# Patient Record
Sex: FEMALE | Race: WHITE | NOT HISPANIC OR LATINO | Employment: OTHER | URBAN - METROPOLITAN AREA
[De-identification: names, ages, dates, MRNs, and addresses within clinical notes are randomized per-mention and may not be internally consistent; named-entity substitution may affect disease eponyms.]

---

## 2020-11-16 ENCOUNTER — CONVERSION ENCOUNTER (OUTPATIENT)
Dept: FAMILY MEDICINE CLINIC | Facility: CLINIC | Age: 65
End: 2020-11-16

## 2020-11-16 ENCOUNTER — OFFICE VISIT CONVERTED (OUTPATIENT)
Dept: FAMILY MEDICINE CLINIC | Facility: CLINIC | Age: 65
End: 2020-11-16
Attending: NURSE PRACTITIONER

## 2020-11-20 ENCOUNTER — HOSPITAL ENCOUNTER (OUTPATIENT)
Dept: LAB | Facility: HOSPITAL | Age: 65
Discharge: HOME | End: 2020-11-20
Attending: NURSE PRACTITIONER

## 2020-11-20 LAB
25(OH)D3 SERPL-MCNC: 24 NG/ML (ref 30–100)
ALBUMIN SERPL-MCNC: 3.8 G/DL (ref 3.5–5)
ALBUMIN/GLOB SERPL: 1 {RATIO} (ref 1.4–2.6)
ALP SERPL-CCNC: 113 U/L (ref 43–160)
ALT SERPL-CCNC: 10 U/L (ref 10–40)
ANION GAP SERPL CALC-SCNC: 16 MMOL/L (ref 8–19)
AST SERPL-CCNC: 15 U/L (ref 15–50)
BASOPHILS # BLD AUTO: 0.04 10*3/UL (ref 0–0.2)
BASOPHILS NFR BLD AUTO: 0.5 % (ref 0–3)
BILIRUB SERPL-MCNC: 0.62 MG/DL (ref 0.2–1.3)
BUN SERPL-MCNC: 16 MG/DL (ref 5–25)
BUN/CREAT SERPL: 15 {RATIO} (ref 6–20)
CALCIUM SERPL-MCNC: 9.8 MG/DL (ref 8.7–10.4)
CHLORIDE SERPL-SCNC: 99 MMOL/L (ref 99–111)
CHOLEST SERPL-MCNC: 170 MG/DL (ref 107–200)
CHOLEST/HDLC SERPL: 3.9 {RATIO} (ref 3–6)
CONV ABS IMM GRAN: 0.04 10*3/UL (ref 0–0.2)
CONV CO2: 27 MMOL/L (ref 22–32)
CONV CREATININE URINE, RANDOM: 120 MG/DL (ref 10–300)
CONV IMMATURE GRAN: 0.5 % (ref 0–1.8)
CONV MICROALBUM.,U,RANDOM: 25.6 MG/L (ref 0–20)
CONV TOTAL PROTEIN: 7.5 G/DL (ref 6.3–8.2)
CREAT UR-MCNC: 1.07 MG/DL (ref 0.5–0.9)
DEPRECATED RDW RBC AUTO: 50.1 FL (ref 36.4–46.3)
EOSINOPHIL # BLD AUTO: 0.34 10*3/UL (ref 0–0.7)
EOSINOPHIL # BLD AUTO: 3.9 % (ref 0–7)
ERYTHROCYTE [DISTWIDTH] IN BLOOD BY AUTOMATED COUNT: 16 % (ref 11.7–14.4)
EST. AVERAGE GLUCOSE BLD GHB EST-MCNC: 197 MG/DL
FERRITIN SERPL-MCNC: 127 NG/ML (ref 10–200)
FOLATE SERPL-MCNC: 4.9 NG/ML (ref 4.8–20)
GFR SERPLBLD BASED ON 1.73 SQ M-ARVRAT: 54 ML/MIN/{1.73_M2}
GLOBULIN UR ELPH-MCNC: 3.7 G/DL (ref 2–3.5)
GLUCOSE SERPL-MCNC: 163 MG/DL (ref 65–99)
HBA1C MFR BLD: 8.5 % (ref 3.5–5.7)
HCT VFR BLD AUTO: 40.8 % (ref 37–47)
HDLC SERPL-MCNC: 44 MG/DL (ref 40–60)
HGB BLD-MCNC: 13.1 G/DL (ref 12–16)
IRON SATN MFR SERPL: 20 % (ref 20–55)
IRON SERPL-MCNC: 61 UG/DL (ref 60–170)
LDLC SERPL CALC-MCNC: 106 MG/DL (ref 70–100)
LYMPHOCYTES # BLD AUTO: 1.71 10*3/UL (ref 1–5)
LYMPHOCYTES NFR BLD AUTO: 19.5 % (ref 20–45)
MCH RBC QN AUTO: 27.7 PG (ref 27–31)
MCHC RBC AUTO-ENTMCNC: 32.1 G/DL (ref 33–37)
MCV RBC AUTO: 86.3 FL (ref 81–99)
MICROALBUMIN/CREAT UR: 21.3 MG/G{CRE} (ref 0–35)
MONOCYTES # BLD AUTO: 0.49 10*3/UL (ref 0.2–1.2)
MONOCYTES NFR BLD AUTO: 5.6 % (ref 3–10)
NEUTROPHILS # BLD AUTO: 6.13 10*3/UL (ref 2–8)
NEUTROPHILS NFR BLD AUTO: 70 % (ref 30–85)
NRBC CBCN: 0 % (ref 0–0.7)
OSMOLALITY SERPL CALC.SUM OF ELEC: 291 MOSM/KG (ref 273–304)
PLATELET # BLD AUTO: 209 10*3/UL (ref 130–400)
PMV BLD AUTO: 9.1 FL (ref 9.4–12.3)
POTASSIUM SERPL-SCNC: 4.2 MMOL/L (ref 3.5–5.3)
RBC # BLD AUTO: 4.73 10*6/UL (ref 4.2–5.4)
SODIUM SERPL-SCNC: 138 MMOL/L (ref 135–147)
TIBC SERPL-MCNC: 307 UG/DL (ref 245–450)
TRANSFERRIN SERPL-MCNC: 215 MG/DL (ref 250–380)
TRIGL SERPL-MCNC: 100 MG/DL (ref 40–150)
VIT B12 SERPL-MCNC: 349 PG/ML (ref 211–911)
VLDLC SERPL-MCNC: 20 MG/DL (ref 5–37)
WBC # BLD AUTO: 8.75 10*3/UL (ref 4.8–10.8)

## 2021-02-24 ENCOUNTER — OFFICE VISIT CONVERTED (OUTPATIENT)
Dept: FAMILY MEDICINE CLINIC | Facility: CLINIC | Age: 66
End: 2021-02-24
Attending: NURSE PRACTITIONER

## 2021-02-25 ENCOUNTER — HOSPITAL ENCOUNTER (OUTPATIENT)
Dept: OTHER | Facility: HOSPITAL | Age: 66
Discharge: HOME | End: 2021-02-25
Attending: NURSE PRACTITIONER

## 2021-02-25 LAB
25(OH)D3 SERPL-MCNC: 22.4 NG/ML (ref 30–100)
ALBUMIN SERPL-MCNC: 3.5 G/DL (ref 3.5–5)
ALBUMIN/GLOB SERPL: 1.1 {RATIO} (ref 1.4–2.6)
ALP SERPL-CCNC: 132 U/L (ref 43–160)
ALT SERPL-CCNC: 11 U/L (ref 10–40)
ANION GAP SERPL CALC-SCNC: 9 MMOL/L (ref 8–19)
AST SERPL-CCNC: 10 U/L (ref 15–50)
BASOPHILS # BLD AUTO: 0.06 10*3/UL (ref 0–0.2)
BASOPHILS NFR BLD AUTO: 0.6 % (ref 0–3)
BILIRUB SERPL-MCNC: 0.73 MG/DL (ref 0.2–1.3)
BNP SERPL-MCNC: 171 PG/ML (ref 0–900)
BUN SERPL-MCNC: 12 MG/DL (ref 5–25)
BUN/CREAT SERPL: 14 {RATIO} (ref 6–20)
CALCIUM SERPL-MCNC: 9.2 MG/DL (ref 8.7–10.4)
CHLORIDE SERPL-SCNC: 95 MMOL/L (ref 99–111)
CHOLEST SERPL-MCNC: 187 MG/DL (ref 107–200)
CHOLEST/HDLC SERPL: 4 {RATIO} (ref 3–6)
CONV ABS IMM GRAN: 0.04 10*3/UL (ref 0–0.2)
CONV CO2: 29 MMOL/L (ref 22–32)
CONV CREATININE URINE, RANDOM: 113.5 MG/DL (ref 10–300)
CONV IMMATURE GRAN: 0.4 % (ref 0–1.8)
CONV MICROALBUM.,U,RANDOM: 22.9 MG/L (ref 0–20)
CONV TOTAL PROTEIN: 6.7 G/DL (ref 6.3–8.2)
CREAT UR-MCNC: 0.87 MG/DL (ref 0.5–0.9)
DEPRECATED RDW RBC AUTO: 49.8 FL (ref 36.4–46.3)
EOSINOPHIL # BLD AUTO: 0.28 10*3/UL (ref 0–0.7)
EOSINOPHIL # BLD AUTO: 2.7 % (ref 0–7)
ERYTHROCYTE [DISTWIDTH] IN BLOOD BY AUTOMATED COUNT: 15.5 % (ref 11.7–14.4)
EST. AVERAGE GLUCOSE BLD GHB EST-MCNC: 194 MG/DL
FERRITIN SERPL-MCNC: 147 NG/ML (ref 10–200)
GFR SERPLBLD BASED ON 1.73 SQ M-ARVRAT: >60 ML/MIN/{1.73_M2}
GLOBULIN UR ELPH-MCNC: 3.2 G/DL (ref 2–3.5)
GLUCOSE SERPL-MCNC: 335 MG/DL (ref 65–99)
HBA1C MFR BLD: 8.4 % (ref 3.5–5.7)
HCT VFR BLD AUTO: 40 % (ref 37–47)
HDLC SERPL-MCNC: 47 MG/DL (ref 40–60)
HGB BLD-MCNC: 13.2 G/DL (ref 12–16)
IRON SATN MFR SERPL: 21 % (ref 20–55)
IRON SERPL-MCNC: 59 UG/DL (ref 60–170)
LDLC SERPL CALC-MCNC: 119 MG/DL (ref 70–100)
LYMPHOCYTES # BLD AUTO: 1.61 10*3/UL (ref 1–5)
LYMPHOCYTES NFR BLD AUTO: 15.3 % (ref 20–45)
MCH RBC QN AUTO: 29.1 PG (ref 27–31)
MCHC RBC AUTO-ENTMCNC: 33 G/DL (ref 33–37)
MCV RBC AUTO: 88.1 FL (ref 81–99)
MICROALBUMIN/CREAT UR: 20.2 MG/G{CRE} (ref 0–35)
MONOCYTES # BLD AUTO: 0.54 10*3/UL (ref 0.2–1.2)
MONOCYTES NFR BLD AUTO: 5.1 % (ref 3–10)
NEUTROPHILS # BLD AUTO: 7.99 10*3/UL (ref 2–8)
NEUTROPHILS NFR BLD AUTO: 75.9 % (ref 30–85)
NRBC CBCN: 0 % (ref 0–0.7)
OSMOLALITY SERPL CALC.SUM OF ELEC: 281 MOSM/KG (ref 273–304)
PLATELET # BLD AUTO: 223 10*3/UL (ref 130–400)
PMV BLD AUTO: 9.4 FL (ref 9.4–12.3)
POTASSIUM SERPL-SCNC: 4.3 MMOL/L (ref 3.5–5.3)
RBC # BLD AUTO: 4.54 10*6/UL (ref 4.2–5.4)
SODIUM SERPL-SCNC: 129 MMOL/L (ref 135–147)
TIBC SERPL-MCNC: 277 UG/DL (ref 245–450)
TRANSFERRIN SERPL-MCNC: 194 MG/DL (ref 250–380)
TRIGL SERPL-MCNC: 103 MG/DL (ref 40–150)
VLDLC SERPL-MCNC: 21 MG/DL (ref 5–37)
WBC # BLD AUTO: 10.52 10*3/UL (ref 4.8–10.8)

## 2021-03-05 ENCOUNTER — HOSPITAL ENCOUNTER (OUTPATIENT)
Dept: CT IMAGING | Facility: HOSPITAL | Age: 66
Discharge: HOME | End: 2021-03-05
Attending: NURSE PRACTITIONER

## 2021-03-08 ENCOUNTER — TELEMEDICINE CONVERTED (OUTPATIENT)
Dept: FAMILY MEDICINE CLINIC | Facility: CLINIC | Age: 66
End: 2021-03-08
Attending: NURSE PRACTITIONER

## 2021-03-15 ENCOUNTER — OFFICE VISIT CONVERTED (OUTPATIENT)
Dept: PODIATRY | Facility: CLINIC | Age: 66
End: 2021-03-15
Attending: PODIATRIST

## 2021-03-18 ENCOUNTER — HOSPITAL ENCOUNTER (OUTPATIENT)
Dept: CARDIOLOGY | Facility: HOSPITAL | Age: 66
Discharge: HOME | End: 2021-03-18
Attending: NURSE PRACTITIONER

## 2021-03-24 ENCOUNTER — OFFICE VISIT CONVERTED (OUTPATIENT)
Dept: FAMILY MEDICINE CLINIC | Facility: CLINIC | Age: 66
End: 2021-03-24
Attending: NURSE PRACTITIONER

## 2021-03-31 ENCOUNTER — HOSPITAL ENCOUNTER (OUTPATIENT)
Dept: GENERAL RADIOLOGY | Facility: HOSPITAL | Age: 66
Discharge: HOME | End: 2021-03-31
Attending: NURSE PRACTITIONER

## 2021-04-01 ENCOUNTER — HOSPITAL ENCOUNTER (OUTPATIENT)
Dept: GENERAL RADIOLOGY | Facility: HOSPITAL | Age: 66
Discharge: HOME | End: 2021-04-01
Attending: NURSE PRACTITIONER

## 2021-04-01 ENCOUNTER — TELEMEDICINE CONVERTED (OUTPATIENT)
Dept: FAMILY MEDICINE CLINIC | Facility: CLINIC | Age: 66
End: 2021-04-01
Attending: NURSE PRACTITIONER

## 2021-04-07 ENCOUNTER — CONVERSION ENCOUNTER (OUTPATIENT)
Dept: GASTROENTEROLOGY | Facility: CLINIC | Age: 66
End: 2021-04-07
Attending: INTERNAL MEDICINE

## 2021-04-14 ENCOUNTER — HOSPITAL ENCOUNTER (OUTPATIENT)
Dept: ULTRASOUND IMAGING | Facility: HOSPITAL | Age: 66
Discharge: HOME | End: 2021-04-14
Attending: NURSE PRACTITIONER

## 2021-05-06 ENCOUNTER — HOSPITAL ENCOUNTER (OUTPATIENT)
Dept: GENERAL RADIOLOGY | Facility: HOSPITAL | Age: 66
Discharge: HOME | End: 2021-05-06
Attending: NURSE PRACTITIONER

## 2021-05-10 ENCOUNTER — HOSPITAL ENCOUNTER (OUTPATIENT)
Dept: FAMILY MEDICINE CLINIC | Facility: CLINIC | Age: 66
Discharge: HOME | End: 2021-05-10
Attending: NURSE PRACTITIONER

## 2021-05-10 ENCOUNTER — CONVERSION ENCOUNTER (OUTPATIENT)
Dept: FAMILY MEDICINE CLINIC | Facility: CLINIC | Age: 66
End: 2021-05-10

## 2021-05-10 ENCOUNTER — OFFICE VISIT CONVERTED (OUTPATIENT)
Dept: FAMILY MEDICINE CLINIC | Facility: CLINIC | Age: 66
End: 2021-05-10
Attending: NURSE PRACTITIONER

## 2021-05-10 LAB
BILIRUB UR QL STRIP: NORMAL
COLOR UR: YELLOW
CONV CLARITY OF URINE: CLEAR
CONV PROTEIN IN URINE BY AUTOMATED TEST STRIP: NORMAL
CONV UROBILINOGEN IN URINE BY AUTOMATED TEST STRIP: NORMAL
GLUCOSE UR QL: NEGATIVE
HGB UR QL STRIP: NORMAL
KETONES UR QL STRIP: NORMAL
LEUKOCYTE ESTERASE UR QL STRIP: NORMAL
NITRITE UR QL STRIP: POSITIVE
PH UR STRIP.AUTO: 6 [PH]
SP GR UR: 1.03

## 2021-05-10 NOTE — H&P
History and Physical      Patient Name: Anita Lemus   Patient ID: [id number]   Sex: Female   YOB: 1955    Primary Care Provider: Janet GHOTRA    Visit Date: November 16, 2020    Provider: PARADISE Reza   Location: [facility]   Location Address: [address] [place] [address], [address]  DERRICK Turpin  [id number]   Location Phone: [phone]          Chief Complaint  · New patient - Establish care      History Of Present Illness  Anita Lemus is a 65 year old female who presents for evaluation and treatment of:      New patient to establish new primary care.  Patient's previous PCP was Jannie Funez DO in [place], [place].    PMH:    DM2:  Takses Metformin, Ozempic, Humulin.  Patient reports FBS average of 130.  Patient reports last A1c was approx 1 month ago and was 'real good'.  Pt has never consulted Endocrinology. DM eye exam 2020, DM foot exam 6 months ago.    HTN:  Takes Lisinopril, Lasix.  Patient's BP in clinic today is 167/48.  Patient denies CP, SOA.  Patient monitors BP at home with readings of 140s/90s.  Patient consulted Cardiology 'years ago', did tests and reports she was told her heart is in good shape.Pt     Vitamin D Deficiency:  Takes Vitamin D2 weekly.    Anemia:  Takes Ferrous Sulfate.    Back pain/Bilateral knee pain:  Takes Methadone.  Patient has not consulted pain management.  pt admits to DDD of low back area and bilateral knee pain.     Intertrigal yeast- pt uses nystatin cream to intertrigal area underneath breaths and abdominal fold. She is requesting refill.    Mammogram: 3/12/20, 9/12/20, [place]  DEXA:  3/12/20, [place]  Pap: 5/15/20  per JOLANTA Funez DO  Colonoscopy: 2020, [place], [place]       Past Medical History  Disease Name Date Onset Notes   Hypertension --  --    Type 2 diabetes mellitus --  --          Past Surgical History  Procedure Name Date Notes   Aspiration of thyroid cyst --   --    Hysterectomy --  total   Knee arthroscopy, diagnostic --  left   Partial thyroidectomy --  --          Medication List  Name Date Started Instructions   ferrous sulfate 325 mg (65 mg iron) oral tablet  take 1 tablet (325 mg) by oral route once daily   Humulin N NPH Insulin KwikPen 100 unit/mL (3 mL) subcutaneous insulin pen 11/16/2020 inject 35 initsby subcutaneous route bid   Lasix 20 mg oral tablet 11/16/2020 one po TID prn   lisinopril 10 mg oral tablet 11/16/2020 take 1 tablet (10 mg) by oral route once daily for 30 days   metformin 500 mg oral tablet 11/16/2020 take 2 tablets (1,000 mg) by oral route 2 times per day with morning and evening meals for 30 days   methadone 5 mg oral tablet  take 1 tablet by oral route 3 times a day   nystatin 100,000 unit/gram topical cream 11/16/2020 apply to the affected area(s) by topical route 2 times per day for 90 days   Ozempic 0.25 mg or 0.5 mg(2 mg/1.5 mL) subcutaneous pen injector  inject 0.4 milliliter (0.5 mg) by subcutaneous route once weekly on the same day of each week, in the abdomen, thighs, or upper arm rotating injection sites   Vitamin D2 1,250 mcg (50,000 unit) oral capsule  take 1 capsule by oral route once a week         Allergy List  Allergen Name Date Reaction Notes   Cipro --  --  --    Flexeril --  --  --    Levaquin --  --  --    Toradol --  --  --        Allergies Reconciled  Family Medical History  Disease Name Relative/Age Notes   Family history of heart disease Father/  Mother/   --    Family history of diabetes mellitus Mother/   --    Family history of emphysema Father/   --          Social History  Finding Status Start/Stop Quantity Notes   Alcohol Never --/-- --  --    Sedentary --  --/-- --  --    Tobacco Never --/-- --  --          Immunizations  NameDate Admin Mfg Trade Name Lot Number Route Inj VIS Given VIS Publication   [id number]/15/2020 SKB Fluzone Quadrivalent  NE NE 11/16/2020    Comments:          Review of  "Systems  · Constitutional  o Denies  o : fatigue  · Eyes  o Denies  o : blurred vision, changes in vision  · HENT  o Denies  o : headaches  · Cardiovascular  o Denies  o : chest pain, irregular heart beats, rapid heart rate, dyspnea on exertion  · Respiratory  o Denies  o : shortness of breath, wheezing, cough  · Gastrointestinal  o Denies  o : nausea, vomiting, diarrhea, constipation, abdominal pain, blood in stools, melena  · Genitourinary  o Denies  o : frequency, dysuria, hematuria  · Integument  o Denies  o : rash, new skin lesions  · Musculoskeletal  o Admits  o : joint pain, back pain  o Denies  o : joint swelling, muscle pain  · Endocrine  o Denies  o : polyuria, polydipsia      Vitals  Date Time BP Position Site L\R Cuff Size HR RR TEMP (F) WT  HT  BMI kg/m2 BSA m2 O2 Sat FR L/min FiO2        11/16/2020 11:11 /48 Sitting    70 - R  97.8 327lbs 8oz 5'  3\" 58.01 2.57 98 %  21%          Physical Examination  · Constitutional  o Appearance  o : obese, well developed, no obvious deformities present  · Head and Face  o Head  o : normocephalic, atraumatic  · Neck  o Inspection/Palpation  o : normal appearance, no masses or tenderness, trachea midline  o Thyroid  o : gland size normal, nontender, no nodules or masses present on palpation  · Respiratory  o Respiratory Effort  o : breathing unlabored  o Inspection of Chest  o : chest rise symmetric bilaterally  o Auscultation of Lungs  o : clear to auscultation bilaterally throughout inspiration and expiration  · Cardiovascular  o Heart  o :   § Auscultation of Heart  § : regular rate and rhythm, no murmurs, gallops or rubs  o Peripheral Vascular System  o :   § Extremities  § : no edema  · Lymphatic  o Neck  o : no cervical lymphadenopathy, no supraclavicular lymphadenopathy  · Psychiatric  o Mood and Affect  o : mood normal, affect appropriate          Assessment  · Screening for depression     V79.0/Z13.89  · Anemia     285.9/D64.9  · Diabetes mellitus, " type 2     250.00/E11.9  · Essential hypertension     401.9/I10  · Lumbago     724.2/M54.5  · Obesity     278.00/E66.9  · Osteoarthritis     715.90/M19.90  · Vitamin D deficiency     268.9/E55.9  · Establishing care with new doctor, encounter for     V65.8/Z76.89  · Knee pain, left     719.46/M25.562  · Knee pain, right     719.46/M25.561  · Routine lab draw     V72.60/Z01.89    Problems Reconciled  Plan  · Orders  o ACO-18: Positive screen for clinical depression using a standardized tool and a follow-up plan documented () - V79.0/Z13.89 - 11/16/2020   5  o B12 Folate levels (B12FO) - 285.9/D64.9 - 11/16/2020  o CBC with Auto Diff [facility] (10006) - 285.9/D64.9 - 11/16/2020  o Iron panel (iron, TIBC, transferrin saturation) (54473, 15607, 71772) - 285.9/D64.9 - 11/16/2020  o Ferritin ser/plas (31928) - 285.9/D64.9 - 11/16/2020  o Diabetes 2 Panel (Urine Microalbumin, CMP, Lipid, A1c, ) [facility] (25040, 01354, 39536, 89094) - 250.00/E11.9 - 11/16/2020  o OPHTHALMOLOGY CONSULTATION (OPHTH) - 250.00/E11.9 - 11/16/2020  o Vitamin D Level (70054) - 268.9/E55.9 - 11/16/2020  o ACO-39: Current medications updated and reviewed (1159F, ) - - 11/16/2020  o ACO-14: Influenza immunization administered or previously received [facility] () - - 11/16/2020  o PAIN MANAGEMENT CONSULTATION (PAINM) - 724.2/M54.5 - 11/16/2020   [facility] pain management  · Medications  o lisinopril 10 mg oral tablet   SIG: take 1 tablet (10 mg) by oral route once daily for 30 days   DISP: (30) Tablet with 5 refills  Prescribed on 11/16/2020     o nystatin 100,000 unit/gram topical cream   SIG: apply to the affected area(s) by topical route 2 times per day for 90 days   DISP: (60) Gram with 5 refills  Prescribed on 11/16/2020     o Medications have been Reconciled  o Transition of Care or Provider Policy  · Instructions  o Depression Screen completed and scanned into the EMR under the designated folder within the patient's documents.  o Today's PHQ-9  result is _5__pt denies depression, states she is just tired.  o Continue blood sugar monitoring daily and record. Bring your log to office visits. Call the office for readings below 70 and above 250 or any complications.  o Daily foot care. Avoid walking barefoot. Annual Dilated Eye Exam.  o Discussed with patient blood pressure monitoring, hemoglobin A1C levels need to be below 7.0, and LDL (Lipid) goals below 70.  o Patient advised to monitor blood pressure (B/P) at home and journal readings. Patient informed that a B/P reading at home of more than 130/80 is considered hypertension. For readings greater lapp278/90 or higher patient is advised to follow up in the office with readings for management. Patient advised to limit sodium intake.  o Patient is taking medications as prescribed and doing well.   o Patient was educated/instructed on their diagnosis, treatment and medications prior to discharge from the clinic today.  o Call the office with any concerns or questions.  o Minutes spent with patient including greater than 50% in Education/Counseling/Care Coordination.  o Electronically Identified Patient Education Materials Provided Electronically  · Disposition  o Follow Up in 3 months     b/p not at goal, advised pt to monitor closely and to call with readings, as dose adjustment might be necessary. Advised pt will not refill methadone. will refer to pain management for further management. will obtain prior medical records.             Electronically Signed by: PARADISE Reza -Author on November 16, 2020 03:14:17 PM

## 2021-05-10 NOTE — H&P
History and Physical      Patient Name: Anita Lemus   Patient ID: [id number]   Sex: Female   YOB: 1955    Primary Care Provider: Janet GHOTRA   Referring Provider: Janet GHOTRA    Visit Date: March 15, 2021    Provider: Darrin Bales DPM   Location[facility]   Location Address: [address] [place] [address]  [id number]   Location Phone: [phone]          Chief Complaint  · Diabetic Foot Evaluation      History Of Present Illness  Anita Lemus presents to the office today as a new patient for a diabetic foot evaluation. On referral from Janet GHOTRA   Patient reports that she is a diabetic currently controlling diabetes with oral medication      New, Established, New Problem: new   Location: bilateral feet  Duration:   Onset: gradual  Nature: NIDDM  Stable, worsening, improving: stable  Aggravating factors:  Previous Treatment: oral medication    Patient denies any fevers, chills, nausea, vomiting, shortness of breathe, nor any other constitutional signs nor symptoms.    Pt states their most recent HgB A1C was 8.4.           Past Medical History  Anemia; Arthritis; Hypertension; Type 1 diabetes mellitus; Type 2 diabetes mellitus; Vitamin D Deficiency         Past Surgical History  Aspiration of thyroid cyst; Hysterectomy; Knee arthroscopy, diagnostic; Partial thyroidectomy         Medication List  amoxicillin 875 mg oral tablet; ferrous sulfate 325 mg (65 mg iron) oral tablet; Humulin N NPH Insulin KwikPen 100 unit/mL (3 mL) subcutaneous insulin pen; Lasix 20 mg oral tablet; lisinopril 10 mg oral tablet; metformin 500 mg oral tablet; nystatin 100,000 unit/gram topical cream; Ozempic 0.25 mg or 0.5 mg(2 mg/1.5 mL) subcutaneous pen injector; Vitamin D2 1,250 mcg (50,000 unit) oral capsule         Allergy List  Cipro; Flexeril; Levaquin; Toradol       Allergies Reconciled  Family Medical History  Family history of heart disease; Family history of diabetes  "mellitus; Family history of emphysema; FH: throat cancer         Social History  Alcohol (Never); Sedentary; Tobacco (Never)         Immunizations  Name Date Admin   Influenza 02/24/2021   Influenza 10/15/2020         Review of Systems  · Constitutional  o Denies  o : fatigue, night sweats  · Eyes  o Denies  o : double vision, blurred vision  · HENT  o Denies  o : vertigo, recent head injury  · Cardiovascular  o Denies  o : chest pain, irregular heart beats  · Respiratory  o Denies  o : shortness of breath, productive cough  · Gastrointestinal  o Denies  o : nausea, vomiting  · Genitourinary  o Denies  o : dysuria, urinary retention  · Integument  o Denies  o : hair growth change, new skin lesions  · Neurologic  o Denies  o : altered mental status, seizures  · Musculoskeletal  o Denies  o : joint swelling, limitation of motion  · Endocrine  o Denies  o : cold intolerance, heat intolerance  · Heme-Lymph  o Denies  o : petechiae, lymph node enlargement or tenderness  · Allergic-Immunologic  o Denies  o : frequent illnesses      Vitals  Date Time BP Position Site L\R Cuff Size HR RR TEMP (F) WT  HT  BMI kg/m2 BSA m2 O2 Sat FR L/min FiO2 HC       03/15/2021 10:54 /64 Sitting    85 - R  96.9  5'  3\"   97 %      03/15/2021 10:54 /59 Sitting                       Physical Examination  · Constitutional  o Appearance  o : overweight, well developed  · Cardiovascular  o Peripheral Vascular System  o :   § Extremities  § : no edema noted  · Musculoskeletal  o Extremeties/Joint  o : Lower extremity muscle strength and range of motion is equal and symmetrical bilaterally. The knees are noted to be in normal alignment. Ankle alignment and range of motion is normal and foot structure is normal. Subtalar, metatarsal and metatarsal-phalangeal joint range of motion is noted to be within normal limits. The digits of both feet are in normal alignment. The gait is normal.  · Left DM Foot Exam  o Sensation  o : Sharp/dull " sensation is within normal limits. Gary-Naz 5.07 monofilament intact to all assessed areas.   o Visual Inspection  o : Skin is noted to have normal texture and turgor, with no excrescences noted. The toenails are noted to be without disease  o Vascular  o : palpable dorsalis pedis and posterir tibialis pulses present, normal capillary refill  · Right DM Foot Exam  o Sensation  o : Sharp/dull sensation is within normal limits. Gary-Naz 5.07 monofilament intact to all assessed areas.   o Visual Inspection  o : Skin is noted to have normal texture and turgor, with no excrescences noted. The toenails are noted to be without disease  o Vascular  o : palpable dorsalis pedis and posterir tibialis pulses present, normal capillary refill          Assessment  · Diabetes     250.00/E11.9      Plan  · Orders  o Diabetic Foot (Motor and Sensory) Exam Completed [facility] (, , 2028F) - - 03/15/2021  o BMI above 25 and plan documented () - - 03/15/2021  · Medications  o Medications have been Reconciled  o Transition of Care or Provider Policy  · Instructions  o Patient is to return in one year for their Podiatric Diabetic Evaluation. Diabetic foot exam performed and documented this date, compliant with CQM required standards. Detail of findings as noted in physical exam.Lower extremity Neuro exam for diabetic patient performed and documented this date, compliant with PQRS required standards. Detail of findings as noted in physical exam.Advised patient importance of good routine lower extremity hygiene. Advised patient importance of evaluating for intact skin and pain free nail borders. Advised patient to use mirror to evaluate plantar/ soles of feet for better visualization. Advised patient monitor and phone office to be seen if any cracking to skin, open lesions, painful nail borders or if nails become elongated prior to next visit. Advised patient importance of daily cleansing of lower extremities,  followed by good skin cream to maintain normal hydration of skin. Also advised patient importance of close daily monitoring of blood sugar. Advised to regulate diet and medications to maintain control of blood sugar in optimal range. Contact primary care provider if difficulties maintaining blood sugar levels.Advised Patient of presence of Diabetes Mellitus condition. Advised Patient risk of progression and worsening or improvement, then return of condition. Will monitor condition for any change in future. Treat with most appropriate treatment pending status of condition.Counseled and advised patient extensively on nature and ramifications of diabetes. Standard instructions given to patient for good diabetic foot care and maintenance. Advised importance of careful monitoring to avoid break down and complications secondary to diabetes. Advised patient importance of strict maintenance of blood sugar control. Advised patient of possible ominous results from neglect of condition,i.e.: amputation/ loss of digits, feet and legs, or even death.Patient states understands counseling, will monitor closely, continue good hygiene and routine diabetic foot care. Patient will contact office is questions or problems.   o BMI Counseling: Discussed weight loss and the need for exercise.   o Electronically Identified Patient Education Materials Provided Electronically  · Disposition  o Call or Return if symptoms worsen or persist.            Electronically Signed by: Darrin Bales DPM -Author on March 15, 2021 11:04:29 AM

## 2021-05-11 ENCOUNTER — HOSPITAL ENCOUNTER (OUTPATIENT)
Dept: GENERAL RADIOLOGY | Facility: HOSPITAL | Age: 66
Discharge: HOME | End: 2021-05-11
Attending: NURSE PRACTITIONER

## 2021-05-11 NOTE — H&P
History and Physical      Patient Name: Anita Lemus   Patient ID: [id number]   Sex: Female   YOB: 1955    Primary Care Provider: Janet GHOTRA   Referring Provider: Allan Hamlin MD    Visit Date: April 7, 2021    Provider: Solomon Meek MD   Location: [facility] [address] [facility]   Location Address: [address] [place] [address]  [id number]   Location Phone: [phone]          Chief Complaint  · Surgical History and Physical  · Screening Colonoscopy  · Positive Cologuard   · Screening phone call completed in order to update information and schedule endoscopy      History Of Present Illness  NON-INPATIENT HISTORY AND PHYSICAL  Allergies: Flexeril and Toradol   Chief Complaint/History of Present Illness: Positive Cologuard, screening   Colon Recall: No   Failed Outpatient Treatment/Contraindications: N/A   Current Medications: albuterol sulfate 90 mcg/actuation inhalation HFA aerosol inhaler, doxycycline monohydrate 100 mg oral tablet, ferrous sulfate 325 mg (65 mg iron) oral tablet, Humulin N NPH Insulin KwikPen 100 unit/mL (3 mL) subcutaneous insulin pen, Lasix 20 mg oral tablet, lisinopril 10 mg oral tablet, metformin 500 mg oral tablet, nystatin 100,000 unit/gram topical cream, promethazine-DM 6.25-15 mg/5 mL oral syrup, and Vitamin D2 1,250 mcg (50,000 unit) oral capsule   Significant Past Medical History: Anemia, Arthritis, Hypertension, Type 1 diabetes mellitus, Type 2 diabetes mellitus, and Vitamin D Deficiency   Significant Family Medical History: family hx of throat cancer, no family hx of colon cancer   Significant Past Surgical History: Aspiration of thyroid cyst, Hysterectomy, Knee arthroscopy, diagnostic, and Partial thyroidectomy   Previous Colonoscopy: No   Previous EGD: No   PHYSICAL EXAM:  Heart: Regular Rate and Rhythm   Lungs: Breathing Unlabored       Past Medical History  Disease Name Date Onset Notes   Anemia --  --    Arthritis --  --    Hypertension --   --    Type 1 diabetes mellitus --  --    Type 2 diabetes mellitus --  --    Vitamin D Deficiency --  --          Past Surgical History  Procedure Name Date Notes   Aspiration of thyroid cyst --  --    Hysterectomy --  total   Knee arthroscopy, diagnostic --  left   Partial thyroidectomy --  --          Medication List  Name Date Started Instructions   albuterol sulfate 90 mcg/actuation inhalation HFA aerosol inhaler 03/24/2021 inhale 1 puff (90 mcg) by inhalation route every 6 hours as needed for 30 days   doxycycline monohydrate 100 mg oral tablet 04/05/2021 take 1 tablet (100 mg) by oral route 2 times per day for 10 days   ferrous sulfate 325 mg (65 mg iron) oral tablet 02/24/2021 take 1 tablet (325 mg) by oral route once daily for 90 days   Humulin N NPH Insulin KwikPen 100 unit/mL (3 mL) subcutaneous insulin pen 02/24/2021 inject 35 initsby subcutaneous route bid   Lasix 20 mg oral tablet 02/24/2021 one po TID prn   lisinopril 10 mg oral tablet 02/24/2021 take 1 tablet (10 mg) by oral route once daily for 30 days   metformin 500 mg oral tablet 02/24/2021 take 2 tablets (1,000 mg) by oral route 2 times per day with morning and evening meals for 30 days   nystatin 100,000 unit/gram topical cream 02/24/2021 apply to the affected area(s) by topical route 2 times per day for 90 days   promethazine-DM 6.25-15 mg/5 mL oral syrup 03/31/2021 take 5 milliliters by oral route 2 times a day for 20 days   Vitamin D2 1,250 mcg (50,000 unit) oral capsule 02/24/2021 take 1 capsule by oral route once a week for 90 days         Allergy List  Allergen Name Date Reaction Notes   Flexeril --  --  --    Toradol --  --  --        Allergies Reconciled  Family Medical History  Disease Name Relative/Age Notes   Family history of heart disease Father/  Mother/   --    Family history of diabetes mellitus Mother/   --    Family history of emphysema Father/   --    FH: throat cancer Grandfather (paternal)/   --          Social  History  Finding Status Start/Stop Quantity Notes   Alcohol Never --/-- --  --    Sedentary --  --/-- --  --    Tobacco Never --/-- --  --          Immunizations  NameDate Admin Mfg Trade Name Lot Number Route Inj VIS Given VIS Publication   Etpsmqlhu75/24/2021 PMC FLUZONE-HIGH DOSE [id number] IM RD 02/24/2021    Comments: Patient tolerated             Assessment  · Preoperative examination     V72.84/Z01.818  · Screening for colon cancer     V76.51/Z12.11  · Positive colorectal cancer screening using DNA-based stool test     787.7/R19.5    Problems Reconciled  Plan  · Orders  o Consent for Colonoscopy with Possible Biopsy - Possible risks/complications, benefits, and alternatives to surgical or invasive procedure have been explained to patient and/or legal guardian. -Patient has been evaluated and can tolerate anesthesia and/or sedation. Risks, benefits, and alternatives to anesthesia and sedation have been explained to patient and/or legal guardian. (88224) - V72.84/Z01.818, V76.51/Z12.11, 787.7/R19.5 - 06/03/2021  · Instructions  o ****Surgical Orders****  o ***************  o Outpatient  o ***************  o RISK AND BENEFITS:  o Possible risks/complications, benefits and alternatives to surgical or invasive procedure have been explained to the patient and/or legal guardian.  o Patient has been evaluated and can tolerate anesthesia and/or sedation. Risks, benefits, and alternatives to anesthesia and sedation have been explained to the patient and/or legal guardian.  o ***************  o PREP: Per protocol  o IV: Per Anesthesia  o The above History and Physical Examination has been completed within 30 days of admission.            Electronically Signed by: Courtney Benjamin MA -Author on April 7, 2021 08:47:26 AM

## 2021-05-13 LAB
AMPICILLIN SUSC ISLT: 4
AMPICILLIN+SULBAC SUSC ISLT: <=2
BACTERIA UR CULT: ABNORMAL
CEFAZOLIN SUSC ISLT: <=4
CEFEPIME SUSC ISLT: <=0.12
CEFTAZIDIME SUSC ISLT: <=1
CEFTRIAXONE SUSC ISLT: <=0.25
CIPROFLOXACIN SUSC ISLT: <=0.25
ERTAPENEM SUSC ISLT: <=0.12
GENTAMICIN SUSC ISLT: <=1
LEVOFLOXACIN SUSC ISLT: <=0.12
NITROFURANTOIN SUSC ISLT: <=16
PIP+TAZO SUSC ISLT: <=4
TMP SMX SUSC ISLT: <=20
TOBRAMYCIN SUSC ISLT: <=1

## 2021-05-14 VITALS
HEART RATE: 85 BPM | DIASTOLIC BLOOD PRESSURE: 64 MMHG | HEIGHT: 63 IN | OXYGEN SATURATION: 97 % | SYSTOLIC BLOOD PRESSURE: 158 MMHG | TEMPERATURE: 96.9 F

## 2021-05-14 VITALS
TEMPERATURE: 97.7 F | WEIGHT: 293 LBS | BODY MASS INDEX: 51.91 KG/M2 | SYSTOLIC BLOOD PRESSURE: 118 MMHG | HEART RATE: 83 BPM | DIASTOLIC BLOOD PRESSURE: 52 MMHG | HEIGHT: 63 IN | OXYGEN SATURATION: 96 %

## 2021-05-14 VITALS
SYSTOLIC BLOOD PRESSURE: 167 MMHG | DIASTOLIC BLOOD PRESSURE: 48 MMHG | WEIGHT: 293 LBS | HEART RATE: 70 BPM | TEMPERATURE: 97.8 F | OXYGEN SATURATION: 98 % | BODY MASS INDEX: 51.91 KG/M2 | HEIGHT: 63 IN

## 2021-05-14 VITALS
OXYGEN SATURATION: 97 % | HEART RATE: 90 BPM | DIASTOLIC BLOOD PRESSURE: 51 MMHG | HEIGHT: 64 IN | WEIGHT: 293 LBS | BODY MASS INDEX: 50.02 KG/M2 | TEMPERATURE: 98.1 F | SYSTOLIC BLOOD PRESSURE: 135 MMHG

## 2021-05-14 NOTE — PROGRESS NOTES
Progress Note      Patient Name: Anita Lemus   Patient ID: [id number]   Sex: Female   YOB: 1955    Primary Care Provider: Janet GHOTRA   Referring Provider: Janet GHOTRA    Visit Date: March 24, 2021    Provider: PARADISE Reza   Location: [facility]   Location Address: [address] [place] [address], [address]  [place], [place]  [id number]   Location Phone: [phone]          Chief Complaint  · Follow up - Leg swelling  · Cough, congestion      History Of Present Illness  Anita Lemus is a 66 year old female who presents for evaluation and treatment of:      1 month follow up on left lower leg swelling/edema.  Patient seen in office 2/24/21 with complaint of leg being swollen and purple X 6 months.  Patient states her leg is still about  the same, swollen and purple.  Patient notes symptoms are no worse than they were. Patient states she has been keeping elevated as much as possible. She was given RX for NIKOLE hose at last OV and pt states she has been wearing.     Patient continues to complain of productive cough - white with yellow chunks, chest congestion.  Patient seen in clinic for same on 3/8/21, completed Amoxicillin X 10 days, then completed Zpak.  Patient denies fever, sore throat, CP.  Patient is requesting another round of Azithromycin. pt had chest CT scan which was normal.     pt has mammogram rescheduled for 3/31.       Past Medical History  Disease Name Date Onset Notes   Anemia --  --    Arthritis --  --    Hypertension --  --    Type 1 diabetes mellitus --  --    Type 2 diabetes mellitus --  --    Vitamin D Deficiency --  --          Past Surgical History  Procedure Name Date Notes   Aspiration of thyroid cyst --  --    Hysterectomy --  total   Knee arthroscopy, diagnostic --  left   Partial thyroidectomy --  --          Medication List  Name Date Started Instructions   ferrous sulfate 325 mg (65 mg iron) oral tablet 02/24/2021 take 1 tablet (325 mg) by oral  route once daily for 90 days   Humulin N NPH Insulin KwikPen 100 unit/mL (3 mL) subcutaneous insulin pen 02/24/2021 inject 35 initsby subcutaneous route bid   Lasix 20 mg oral tablet 02/24/2021 one po TID prn   lisinopril 10 mg oral tablet 02/24/2021 take 1 tablet (10 mg) by oral route once daily for 30 days   metformin 500 mg oral tablet 02/24/2021 take 2 tablets (1,000 mg) by oral route 2 times per day with morning and evening meals for 30 days   nystatin 100,000 unit/gram topical cream 02/24/2021 apply to the affected area(s) by topical route 2 times per day for 90 days   Ozempic 0.25 mg or 0.5 mg(2 mg/1.5 mL) subcutaneous pen injector 02/24/2021 inject 0.4 milliliter by subcutaneous route once a week for 30 days   Vitamin D2 1,250 mcg (50,000 unit) oral capsule 02/24/2021 take 1 capsule by oral route once a week for 90 days         Allergy List  Allergen Name Date Reaction Notes   Cipro --  --  --    Flexeril --  --  --    Levaquin --  --  --    Toradol --  --  --        Allergies Reconciled  Family Medical History  Disease Name Relative/Age Notes   Family history of heart disease Father/  Mother/   --    Family history of diabetes mellitus Mother/   --    Family history of emphysema Father/   --    FH: throat cancer Mother/   --          Social History  Finding Status Start/Stop Quantity Notes   Alcohol Never --/-- --  --    Sedentary --  --/-- --  --    Tobacco Never --/-- --  --          Immunizations  NameDate Admin Mfg Trade Name Lot Number Route Inj VIS Given VIS Publication   [id number]/24/2021 [facility] FLUZONE-HIGH DOSE [id number] IM RD 02/24/2021    Comments: Patient tolerated         Review of Systems  · Constitutional  o Admits  o : leg edema  o Denies  o : fatigue  · Eyes  o Denies  o : blurred vision, changes in vision  · HENT  o Denies  o : headaches  · Cardiovascular  o Denies  o : chest pain, irregular heart beats, rapid heart rate, dyspnea on exertion  · Respiratory  o Admits  o : cough  o Denies  o :  "shortness of breath, wheezing  · Gastrointestinal  o Denies  o : nausea, vomiting, diarrhea, constipation, abdominal pain, blood in stools, melena  · Genitourinary  o Denies  o : frequency, dysuria, hematuria  · Integument  o Denies  o : rash, new skin lesions  · Musculoskeletal  o Denies  o : joint pain, joint swelling, muscle pain  · Endocrine  o Denies  o : polyuria, polydipsia      Vitals  Date Time BP Position Site L\R Cuff Size HR RR TEMP (F) WT  HT  BMI kg/m2 BSA m2 O2 Sat FR L/min FiO2 HC       02/24/2021 08:33 /51 Sitting    90 - R  98.1 309lbs 2oz 5'  4\" 53.06 2.52 97 %  21%    03/15/2021 10:54 /64 Sitting    85 - R  96.9  5'  3\"   97 %      03/24/2021 10:28 /52 Sitting    83 - R  97.7 311lbs 6oz 5'  3\" 55.16 2.51 96 %  21%          Physical Examination  · Constitutional  o Appearance  o : well developed, well-nourished, no acute distress  · Head and Face  o Head  o : normocephalic, atraumatic  · Ears, Nose, Mouth and Throat  o Ears  o :   § External Ears  § : external auditory canal appearance normal, no discharge present  § Otoscopic Examination  § : tympanic membranes pearly white/gray bilaterally  o Nose  o :   § External Nose  § : no lesions noted  § Nasopharynx  § : no discharge present  o Oral Cavity  o :   § Oral Mucosa  § : oral mucosa light pink  o Throat  o :   § Oropharynx  § : tonsils without exudate, no palatal petechiae  · Neck  o Inspection/Palpation  o : normal appearance, no masses or tenderness, trachea midline  o Thyroid  o : gland size normal, nontender, no nodules or masses present on palpation  · Respiratory  o Respiratory Effort  o : breathing unlabored  o Inspection of Chest  o : chest rise symmetric bilaterally  o Auscultation of Lungs  o : clear to auscultation bilaterally throughout inspiration and expiration  · Cardiovascular  o Heart  o :   § Auscultation of Heart  § : regular rate and rhythm, no murmurs, gallops or rubs  o Peripheral Vascular System  o : "   § Extremities  § : no edema  · Lymphatic  o Neck  o : no cervical lymphadenopathy, no supraclavicular lymphadenopathy  · Psychiatric  o Mood and Affect  o : mood normal, affect appropriate     left leg-54cm circumference  right leg-50cm circumference           Assessment  · Cough     786.2/R05  trial albuterol inhaler prn, side effects and administration addressed, Kenalog 40mg IM  · Leg edema, left     782.3/R60.0  check VELE  · Chest congestion     786.9/R09.89  · Positive colorectal cancer screening using DNA-based stool test     787.7/R19.5  refer for colonoscopy    Problems Reconciled  Plan  · Orders  o IM - Injection Fee [facility] ([id number]) - - 03/24/2021  o ACO-14: Influenza immunization administered or previously received [facility] () - - 03/24/2021  o ACO-39: Current medications updated and reviewed (, 1159F) - - 03/24/2021  o 4.00 - Kenalog Injection 40mg (-2) - 786.2/R05, 786.9/R09.89 - 03/24/2021   Injection - Kenalog 40 mg; Dose: 40 mg; Site: Right Gluteus; Route: intramuscular; Date: 03/24/2021 14:46:26; Exp: 02/28/2022; Lot: [id number]; Mfg: [employer]; TradeName: triamcinolone; Location: [facility]; Administered By: Christi Rascon MA; Comment: patient tolerated  o VELE (Dop Scan) Unilateral. (74949) - 782.3/R60.0 - 03/24/2021   left lower extremity  o General Surgery Consult (GNSUR) - 787.7/R19.5 - 03/24/2021   surgical specialists for colonoscopy-pt with positive cologuard  · Medications  o albuterol sulfate 90 mcg/actuation inhalation HFA aerosol inhaler   SIG: inhale 1 puff (90 mcg) by inhalation route every 6 hours as needed for 30 days   DISP: (1) Puff with 0 refills  Prescribed on 03/24/2021     o Medications have been Reconciled  o Transition of Care or Provider Policy  · Instructions  o Take all medications as prescribed/directed.  o Patient was educated/instructed on their diagnosis, treatment and medications prior to discharge from the clinic today.  o Minutes  spent with patient including greater than 50% in Education/Counseling/Care Coordination.  o Chronic conditions reviewed and taken into consideration for today's treatment plan.  o Electronically Identified Patient Education Materials Provided Electronically  · Disposition  o Call or Return if symptoms worsen or persist.  o Care Transition  o SCOT Sent            Electronically Signed by: PARADISE Reza -Author on March 24, 2021 03:23:31 PM

## 2021-05-14 NOTE — PROGRESS NOTES
Progress Note      Patient Name: Anita Lemus   Patient ID: [id number]   Sex: Female   YOB: 1955    Primary Care Provider: Janet GHOTRA    Visit Date: March 8, 2021    Provider: PARADISE Reza   Location: [facility]   Location Address: [address] [place] [address], [address]  [place], [place]  [id number]   Location Phone: [phone]          Chief Complaint  · dry cough      History Of Present Illness  Video Conferencing Visit  Anita Lemus is a 66 year old female who is presenting for evaluation via video conferencing via [employer]. Verbal consent obtained before beginning visit.   The following staff were present during this visit: PARADISE Reza, Gaby Simpson CMA   Anita Lemus is a 66 year old female who presents for evaluation and treatment of:      Patient complaining of cough, nasal congestion, chest congestion, runny nose X 2 weeks.    Patient states symptoms are worsening.  Patient states her cough is mostly dry and hacking but she now sometimes coughs up clear phlegm with yellow balls in it.  Patient states she now has to wear a pad d/t coughing so hard causes her to have urinary incontinence.  Patient denies fever, HA. Pt states she has had a lot of wheezing and she is occasionally short of breath. She has been using OTC cough syrup without any relief.       Past Medical History  Disease Name Date Onset Notes   Anemia --  --    Hypertension --  --    Type 2 diabetes mellitus --  --    Vitamin D Deficiency --  --          Past Surgical History  Procedure Name Date Notes   Aspiration of thyroid cyst --  --    Hysterectomy --  total   Knee arthroscopy, diagnostic --  left   Partial thyroidectomy --  --          Medication List  Name Date Started Instructions   ferrous sulfate 325 mg (65 mg iron) oral tablet 02/24/2021 take 1 tablet (325 mg) by oral route once daily for 90 days   Humulin N NPH Insulin KwikPen 100 unit/mL (3 mL) subcutaneous insulin pen  02/24/2021 inject 35 initsby subcutaneous route bid   Lasix 20 mg oral tablet 02/24/2021 one po TID prn   lisinopril 10 mg oral tablet 02/24/2021 take 1 tablet (10 mg) by oral route once daily for 30 days   metformin 500 mg oral tablet 02/24/2021 take 2 tablets (1,000 mg) by oral route 2 times per day with morning and evening meals for 30 days   nystatin 100,000 unit/gram topical cream 02/24/2021 apply to the affected area(s) by topical route 2 times per day for 90 days   Ozempic 0.25 mg or 0.5 mg(2 mg/1.5 mL) subcutaneous pen injector 02/24/2021 inject 0.4 milliliter by subcutaneous route once a week for 30 days   Vitamin D2 1,250 mcg (50,000 unit) oral capsule 02/24/2021 take 1 capsule by oral route once a week for 90 days         Allergy List  Allergen Name Date Reaction Notes   Cipro --  --  --    Flexeril --  --  --    Levaquin --  --  --    Toradol --  --  --        Allergies Reconciled  Family Medical History  Disease Name Relative/Age Notes   Family history of heart disease Father/  Mother/   --    Family history of diabetes mellitus Mother/   --    Family history of emphysema Father/   --          Social History  Finding Status Start/Stop Quantity Notes   Alcohol Never --/-- --  --    Sedentary --  --/-- --  --    Tobacco Never --/-- --  --          Immunizations  NameDate Admin Mfg Trade Name Lot Number Route Inj VIS Given VIS Publication   [id number]/24/2021 [facility] FLUZONE-HIGH DOSE [id number] IM RD 02/24/2021    Comments: Patient tolerated         Review of Systems  · Constitutional  o Denies  o : fatigue  · Eyes  o Denies  o : blurred vision, changes in vision  · HENT  o Denies  o : headaches  · Cardiovascular  o Denies  o : chest pain, irregular heart beats, rapid heart rate, dyspnea on exertion  · Respiratory  o Admits  o : shortness of breath, wheezing, cough, dry cough  · Gastrointestinal  o Denies  o : nausea, vomiting, diarrhea, constipation, abdominal pain, blood in stools,  melena  · Genitourinary  o Denies  o : frequency, dysuria, hematuria  · Integument  o Denies  o : rash, new skin lesions  · Musculoskeletal  o Denies  o : joint pain, joint swelling, muscle pain  · Endocrine  o Denies  o : polyuria, polydipsia      Physical Examination  · Constitutional  o Appearance  o : well developed, well-nourished, no acute distress  · Head and Face  o Head  o : normocephalic, atraumatic  · Respiratory  o Respiratory Effort  o : breathing unlabored  o Inspection of Chest  o : chest rise symmetric bilaterally  · Psychiatric  o Mood and Affect  o : mood normal, affect appropriate          Assessment  · Cough     786.2/R05  · Chest congestion     786.9/R09.89  · Nasal congestion     478.19/R09.81  · URI (upper respiratory infection)     465.9/J06.9    Problems Reconciled  Plan  · Orders  o ACO-14: Influenza immunization administered or previously received [facility] () - - 03/08/2021  o ACO-39: Current medications updated and reviewed (1159F, ) - - 03/08/2021  · Medications  o amoxicillin 875 mg oral tablet   SIG: take 1 tablet (875 mg) by oral route every 12 hours for 10 days   DISP: (20) Tablet with 0 refills  Prescribed on 03/08/2021     o Medications have been Reconciled  o Transition of Care or Provider Policy  · Instructions  o Take all medications as prescribed/directed.  o Patient was educated/instructed on their diagnosis, treatment and medications prior to discharge from the clinic today.  o Patient instructed to seek medical attention urgently for new or worsening symptoms.  o Call the office with any concerns or questions.  o Electronically Identified Patient Education Materials Provided Electronically  · Disposition  o Call or Return if symptoms worsen or persist.  o Follow Up PRN            Electronically Signed by: PARADISE Reza -Author on March 8, 2021 04:35:11 PM

## 2021-05-14 NOTE — PROGRESS NOTES
Progress Note      Patient Name: Anita Lemus   Patient ID: [id number]   Sex: Female   YOB: 1955    Primary Care Provider: Janet GHOTRA   Referring Provider: Allan Hamlin MD    Visit Date: April 1, 2021    Provider: PARADISE Reza   Location: [facility]   Location Address: [address] [place] [address], [address]  [place], [place]  [id number]   Location Phone: [phone]          Chief Complaint  · Obesity  · Evaluation for bariatric surgery      History Of Present Illness  Video Conferencing Visit  Anita Lemus is a 66 year old female who is presenting for evaluation via video conferencing via [employer]. Verbal consent obtained before beginning visit.   The following staff were present during this visit: PARADISE Reza, Gaby Simpson CMA   Anita Lemus is a 66 year old female who presents for evaluation and treatment of:      Patient has BMI of 55.2 and would like to discuss options for weight loss.  Patient would like to be evaluated for bariatric surgery.  Patient has consulted Dr. Allan Hamlin, bariatric surgeon for same. Pt is needing letter of medical necessity and initial OV with goals. Pt is required by insurance to complete 4 months of pre-surgery lifestyle modifications.       Past Medical History  Disease Name Date Onset Notes   Anemia --  --    Arthritis --  --    Hypertension --  --    Type 1 diabetes mellitus --  --    Type 2 diabetes mellitus --  --    Vitamin D Deficiency --  --          Past Surgical History  Procedure Name Date Notes   Aspiration of thyroid cyst --  --    Hysterectomy --  total   Knee arthroscopy, diagnostic --  left   Partial thyroidectomy --  --          Medication List  Name Date Started Instructions   albuterol sulfate 90 mcg/actuation inhalation HFA aerosol inhaler 03/24/2021 inhale 1 puff (90 mcg) by inhalation route every 6 hours as needed for 30 days   ferrous sulfate 325 mg (65 mg iron) oral tablet 02/24/2021 take 1 tablet  (325 mg) by oral route once daily for 90 days   Humulin N NPH Insulin KwikPen 100 unit/mL (3 mL) subcutaneous insulin pen 02/24/2021 inject 35 initsby subcutaneous route bid   Lasix 20 mg oral tablet 02/24/2021 one po TID prn   lisinopril 10 mg oral tablet 02/24/2021 take 1 tablet (10 mg) by oral route once daily for 30 days   metformin 500 mg oral tablet 02/24/2021 take 2 tablets (1,000 mg) by oral route 2 times per day with morning and evening meals for 30 days   nystatin 100,000 unit/gram topical cream 02/24/2021 apply to the affected area(s) by topical route 2 times per day for 90 days   Ozempic 0.25 mg or 0.5 mg(2 mg/1.5 mL) subcutaneous pen injector 02/24/2021 inject 0.4 milliliter by subcutaneous route once a week for 30 days   promethazine-DM 6.25-15 mg/5 mL oral syrup 03/31/2021 take 5 milliliters by oral route 2 times a day for 20 days   Vitamin D2 1,250 mcg (50,000 unit) oral capsule 02/24/2021 take 1 capsule by oral route once a week for 90 days         Allergy List  Allergen Name Date Reaction Notes   Cipro --  --  --    Flexeril --  --  --    Levaquin --  --  --    Toradol --  --  --        Allergies Reconciled  Family Medical History  Disease Name Relative/Age Notes   Family history of heart disease Father/  Mother/   --    Family history of diabetes mellitus Mother/   --    Family history of emphysema Father/   --    FH: throat cancer Mother/   --          Social History  Finding Status Start/Stop Quantity Notes   Alcohol Never --/-- --  --    Sedentary --  --/-- --  --    Tobacco Never --/-- --  --          Immunizations  NameDate Admin Mfg Trade Name Lot Number Route Inj VIS Given VIS Publication   [id number]/24/2021 [facility] FLUZONE-HIGH DOSE [id number] IM RD 02/24/2021    Comments: Patient tolerated         Review of Systems  · Constitutional  o Denies  o : fatigue  · Eyes  o Denies  o : blurred vision, changes in vision  · HENT  o Denies  o : headaches  · Cardiovascular  o Denies  o : chest pain,  irregular heart beats, rapid heart rate, dyspnea on exertion  · Respiratory  o Denies  o : shortness of breath, wheezing, cough  · Gastrointestinal  o Denies  o : nausea, vomiting, diarrhea, constipation, abdominal pain, blood in stools, melena  · Genitourinary  o Denies  o : frequency, dysuria, hematuria  · Integument  o Denies  o : rash, new skin lesions  · Musculoskeletal  o Denies  o : joint pain, joint swelling, muscle pain  · Endocrine  o Denies  o : polyuria, polydipsia      Physical Examination  · Constitutional  o Appearance  o : well developed, well-nourished, no acute distress  · Head and Face  o Head  o : normocephalic, atraumatic  · Respiratory  o Respiratory Effort  o : breathing unlabored  o Inspection of Chest  o : chest rise symmetric bilaterally  · Psychiatric  o Mood and Affect  o : mood normal, affect appropriate          Assessment  · Obesity     278.00/E66.9  · Adult BMI 50.0-59.9 kg/sq m     V85.43/Z68.43  · Encounter for nutrition evaluation prior to bariatric surgery     V65.3/Z71.3    Problems Reconciled  Plan  · Orders  o ACO-14: Influenza immunization administered or previously received [facility] () - - 04/01/2021  o ACO-39: Current medications updated and reviewed (, 1159F) - - 04/01/2021  · Medications  o Medications have been Reconciled  o Transition of Care or Provider Policy  · Instructions  o Patient is taking medications as prescribed and doing well.   o Patient was educated/instructed on their diagnosis, treatment and medications prior to discharge from the clinic today.  o Call the office with any concerns or questions.  o Electronically Identified Patient Education Materials Provided Electronically  · Disposition  o Follow Up in 1 month            Electronically Signed by: Janet Camarillo APRN -Author on April 1, 2021 08:36:15 AM

## 2021-05-14 NOTE — PROGRESS NOTES
Progress Note      Patient Name: Anita Lemus   Patient ID: [id number]   Sex: Female   YOB: 1955    Primary Care Provider: Janet GHOTRA    Visit Date: February 24, 2021    Provider: PARADISE Reza   Location: [facility]   Location Address: [address] [place] [address], [address]  [place], [place]  [id number]   Location Phone: [phone]          Chief Complaint  · Med refill   · Diabetes follow up  · Leg swelling- Purple  · weight loss surgery  · breast lump      History Of Present Illness  Anita Lemus is a 66 year old female who presents for evaluation and treatment of:      diabetes, obesity, breast lump, Vitamin D def, anemia, HTN, intertrigo rash  Patient is here today to have a medication refill, a diabetes follow up, and a complaint of LLE with discoloration.     Patient is taking:   DM: Humiulin N NPH, Metformin, Ozempic. Patient checks her blood sugar at home, 1-2 times a day. Patient states that it is running normal with an average of about 130. Last A1c was 8.5% on 11/20/2020    Anemia: Ferrous Sulfate-- patient states she has not been taking this medication because she did not have a refill    HTN: Lisinopril, Lasix with good control of b/p. Pt does not monitor her b/p at home.     Rash: Nystatin topical cream-- patient uses this prn forintertrigal  yeast rashes that develop    Vit D Def: Vit D2-- Patient has not taken this as it has not had a refill.     Patient is complaining of LLE with discoloration on the side of her left leg. Patient states that this has been occuring for about 6 months and is sometimes painful. Patient states that she tries to keep them elevated as she has a recliner and sits in it often.    Pt c/o shortness of breath. She states she is having increasing SOB with exertion.    Pt daughter reports she has a mass in her left breast and had abnormal mammogram and was supposed to have a f/u mammo but pt never did.      pt daughter also concerned  about possible enlarged lymph nodes on her neck area, she believes it is on the right side.       Patient would like to discuss the posibility of having weight loss surgery.       Past Medical History  Disease Name Date Onset Notes   Hypertension --  --    Type 2 diabetes mellitus --  --          Past Surgical History  Procedure Name Date Notes   Aspiration of thyroid cyst --  --    Hysterectomy --  total   Knee arthroscopy, diagnostic --  left   Partial thyroidectomy --  --          Medication List  Name Date Started Instructions   ferrous sulfate 325 mg (65 mg iron) oral tablet  take 1 tablet (325 mg) by oral route once daily   Humulin N NPH Insulin KwikPen 100 unit/mL (3 mL) subcutaneous insulin pen 02/03/2021 inject 35 initsby subcutaneous route bid   Lasix 20 mg oral tablet 11/16/2020 one po TID prn   lisinopril 10 mg oral tablet 11/16/2020 take 1 tablet (10 mg) by oral route once daily for 30 days   metformin 500 mg oral tablet 11/16/2020 take 2 tablets (1,000 mg) by oral route 2 times per day with morning and evening meals for 30 days   nystatin 100,000 unit/gram topical cream 11/16/2020 apply to the affected area(s) by topical route 2 times per day for 90 days   Ozempic 0.25 mg or 0.5 mg(2 mg/1.5 mL) subcutaneous pen injector  inject 0.4 milliliter (0.5 mg) by subcutaneous route once weekly on the same day of each week, in the abdomen, thighs, or upper arm rotating injection sites   Vitamin D2 1,250 mcg (50,000 unit) oral capsule  take 1 capsule by oral route once a week         Allergy List  Allergen Name Date Reaction Notes   Cipro --  --  --    Flexeril --  --  --    Levaquin --  --  --    Toradol --  --  --          Family Medical History  Disease Name Relative/Age Notes   Family history of heart disease Father/  Mother/   --    Family history of diabetes mellitus Mother/   --    Family history of emphysema Father/   --          Social History  Finding Status Start/Stop Quantity Notes   Alcohol Never  "--/-- --  --    Sedentary --  --/-- --  --    Tobacco Never --/-- --  --          Immunizations  NameDate Admin Mfg Trade Name Lot Number Route Inj VIS Given VIS Publication   [id number]/15/2020 [employer] Fluzone Quadrivalent  NE NE 11/16/2020    Comments:          Review of Systems  · Constitutional  o Denies  o : fatigue  · Eyes  o Denies  o : blurred vision, changes in vision  · HENT  o Denies  o : headaches  · Breasts  o Admits  o : lumps, tenderness  · Cardiovascular  o Denies  o : chest pain, irregular heart beats, rapid heart rate, dyspnea on exertion  · Respiratory  o Admits  o : shortness of breath  o Denies  o : wheezing, cough  · Gastrointestinal  o Denies  o : nausea, vomiting, diarrhea, constipation, abdominal pain, blood in stools, melena  · Genitourinary  o Denies  o : frequency, dysuria, hematuria  · Integument  o Admits  o : rash  o Denies  o : new skin lesions  · Musculoskeletal  o Denies  o : joint pain, joint swelling, muscle pain  · Endocrine  o Denies  o : polyuria, polydipsia      Vitals  Date Time BP Position Site L\R Cuff Size HR RR TEMP (F) WT  HT  BMI kg/m2 BSA m2 O2 Sat FR L/min FiO2 HC       02/24/2021 08:33 /51 Sitting    90 - R  98.1 309lbs 2oz 5'  4\" 53.06 2.52 97 %  21%          Physical Examination  · Constitutional  o Appearance  o : well developed, well-nourished, no acute distress  · Head and Face  o Head  o : normocephalic, atraumatic  · Neck  o Inspection/Palpation  o : normal appearance, no masses or tenderness, trachea midline  o Thyroid  o : gland size normal, nontender, no nodules or masses present on palpation  · Respiratory  o Respiratory Effort  o : breathing unlabored  o Inspection of Chest  o : chest rise symmetric bilaterally  o Auscultation of Lungs  o : clear to auscultation bilaterally throughout inspiration and expiration  · Cardiovascular  o Heart  o :   § Auscultation of Heart  § : regular rate, normal rhythm, murmur present   o Peripheral Vascular System  o : "   § Extremities  § : BLE 1+ edema noted, skin erythematous, positive pedal pulses  · Breasts  o Inspection of Breasts  o : developmental state normal for age  o Palpation of Breasts, Axillae  o : left breast mass present at 11-12 o'clock area, TTP  · Lymphatic  o Neck  o : no cervical lymphadenopathy, no supraclavicular lymphadenopathy  · Psychiatric  o Mood and Affect  o : mood normal, affect appropriate     integumentary-underneath bilateral breasts, skin is erythematous, satellite lesions noted           Assessment  · Need for influenza vaccination     V04.81/Z23  · Screening for colon cancer     V76.51/Z12.11  · Post menopausal syndrome     V49.81/Z78.0  · Visit for screening mammogram     V76.12/Z12.31  · Anemia     285.9/D64.9  · Diabetes mellitus, type 2     250.00/E11.9  · Essential hypertension     401.9/I10  · Obesity     278.00/E66.9  · Vitamin D deficiency     268.9/E55.9  · Hypertension     401.9/I10  · Type 2 diabetes mellitus     250.00/E11.9  · Lower extremity edema     782.3/R60.0  · Edema     782.3/R60.9  · Venous stasis     459.81/I87.8  · Routine lab draw     V72.60/Z01.89  · Shortness of breath     786.05/R06.02  · Breast lump on left side at 12 o'clock position     611.72/N63.25  · Heart murmur     785.2/R01.1  · Intertriginous candidiasis     112.3/B37.2    Problems Reconciled  Plan  · Orders  o Immunization Admin Fee (Single) ([facility]) (63749) - V04.81/Z23 - 02/25/2021  o Fluzone High Dose Flu Vaccine (59587) - V04.81/Z23 - 02/24/2021   Vaccine - Influenza; Dose: 0.5; Site: Right Deltoid; Route: Intramuscular; Date: 02/24/2021 10:52:00; Exp: 06/30/2021; Lot: [id number]; Mfg: sanofi pasteur; TradeName: FLUZONE-HIGH DOSE; Administered By: Christi Rascon MA; Comment: Patient tolerated  o ACO-14: Influenza immunization administered or previously received () - V04.81/Z23 - 02/25/2021  o Cologuard (17091) - V76.51/Z12.11 - 02/24/2021  o DEXA Bone Density, 1 or more sites, axial skeleton [facility] (66626) -  V49.81/Z78.0 - 02/24/2021  o Screening Mammography; Bilateral 3D (11304, 10781, ) - V76.12/Z12.31 - 02/24/2021  o Iron panel (iron, TIBC, transferrin saturation) (45102, 38399, 41596) - 285.9/D64.9 - 02/24/2021  o Ferritin ser/plas (41627) - 285.9/D64.9 - 02/24/2021  o CBC with Auto Diff [facility] (22120) - 285.9/D64.9 - 02/24/2021  o Vitamin D (25-Hydroxy) Level (85027) - 268.9/E55.9 - 02/24/2021  o ACO-14: Influenza immunization administered or previously received [facility] () - - 02/24/2021  o ACO-19: Colorectal cancer screening results documented and reviewed (3017F) - - 02/24/2021  o ACO-39: Current medications updated and reviewed (, 1159F) - - 02/24/2021  o Diabetes 2 Panel (Urine Microalbumin, CMP, Lipid, A1c, ) [facility] (19082, 78563, 34355, 65782) - 250.00/E11.9 - 02/24/2021  o PODIATRY CONSULTATION (PODIA) - 250.00/E11.9 - 02/24/2021   Diabetic Foot exam- Dr. Bales  o OPHTHALMOLOGY CONSULTATION (OPHTH) - 250.00/E11.9 - 02/24/2021   Diabetic eye exam  o CXR PA/Lat [facility] (83589) - 786.05/R06.02 - 02/24/2021  o Bariatric Consultation (BARIA) - 278.00/E66.9 - 02/24/2021   alex Dempsey  o BNP (brain natriuretic peptide measurement) (02191) - 782.3/R60.9, 786.05/R06.02 - 02/24/2021  o Echocardiogram - Complete [facility] (98239, 84254, 62358) - 785.2/R01.1 - 02/24/2021  o Mammogram diagnostic 3D breast unilateral LEFT (w/US if needed) [facility] (, -UG) - 611.72/N63.25 - 02/24/2021  · Medications  o ferrous sulfate 325 mg (65 mg iron) oral tablet   SIG: take 1 tablet (325 mg) by oral route once daily for 90 days   DISP: (90) Tablet with 1 refills  Prescribed on 02/24/2021     o Vitamin D2 1,250 mcg (50,000 unit) oral capsule   SIG: take 1 capsule by oral route once a week for 90 days   DISP: (13) Capsule with 1 refills  Prescribed on 02/24/2021     o Ozempic 0.25 mg or 0.5 mg(2 mg/1.5 mL) subcutaneous pen injector   SIG: inject 0.4 milliliter by subcutaneous route once a week for 30 days    DISP: (1) Milliliter with 5 refills  Adjusted on 02/24/2021     o Humulin N NPH Insulin KwikPen 100 unit/mL (3 mL) subcutaneous insulin pen   SIG: inject 35 initsby subcutaneous route bid   DISP: (3) Pen Needle with 5 refills  Refilled on 02/24/2021     o Lasix 20 mg oral tablet   SIG: one po TID prn   DISP: (90) Tablet with 5 refills  Refilled on 02/24/2021     o lisinopril 10 mg oral tablet   SIG: take 1 tablet (10 mg) by oral route once daily for 30 days   DISP: (30) Tablet with 5 refills  Refilled on 02/24/2021     o metformin 500 mg oral tablet   SIG: take 2 tablets (1,000 mg) by oral route 2 times per day with morning and evening meals for 30 days   DISP: (120) Tablet with 5 refills  Refilled on 02/24/2021     o nystatin 100,000 unit/gram topical cream   SIG: apply to the affected area(s) by topical route 2 times per day for 90 days   DISP: (60) Gram with 5 refills  Refilled on 02/24/2021     o Medications have been Reconciled  o Transition of Care or Provider Policy  · Instructions  o Stop taking calcium supplements for at least 48 hours prior to your exam. Failure to stop supplements could alter results, and the radiologists will require you to reschedule your test.  o Continue blood sugar monitoring daily and record. Bring your log to office visits. Call the office for readings below 70 and above 250 or any complications.  o Daily foot care. Avoid walking barefoot. Annual Dilated Eye Exam.  o Discussed with patient blood pressure monitoring, hemoglobin A1C levels need to be below 7.0, and LDL (Lipid) goals below 70.  o Patient advised to monitor blood pressure (B/P) at home and journal readings. Patient informed that a B/P reading at home of more than 130/80 is considered hypertension. For readings greater qgtu078/90 or higher patient is advised to follow up in the office with readings for management. Patient advised to limit sodium intake.  o Patient is taking medications as prescribed and doing well.    o Patient was educated/instructed on their diagnosis, treatment and medications prior to discharge from the clinic today.  o Call the office with any concerns or questions.  o Patient given paper scripts today for knee hi NIKOLE hose  o Minutes spent with patient including greater than 50% in Education/Counseling/Care Coordination.  o Time spent with the patient was minutes, more than 50% face to face.  o Chronic conditions reviewed and taken into consideration for today's treatment plan.  o Discussed Covid-19 precautions including, but not limited to, social distancing, avoid touching your face, and hand washing.   o Electronically Identified Patient Education Materials Provided Electronically  · Disposition  o Follow Up in 1 month            Electronically Signed by: PARADISE Reza -Author on February 25, 2021 11:07:13 AM

## 2021-06-06 NOTE — PROGRESS NOTES
Progress Note      Patient Name: Anita Lemus   Patient ID: [id number]   Sex: Female   YOB: 1955    Primary Care Provider: Janet GHOTRA   Referring Provider: Allan Hamlin MD    Visit Date: May 10, 2021    Provider: PARADISE Reza   Location: [facility]   Location Address: [address] [place] [address], [address]  [place], [place]  [id number]   Location Phone: [phone]          Chief Complaint  · Rash on back  · Possible UTI      History Of Present Illness  Anita Lemus is a 66 year old female who presents for evaluation and treatment of:      Patient complaining of painful rash X 7-8 days on the back of her waist.  Rash is red, not as painful as it was.  Patient denies new detergents, soaps, lotions.  Patient states she has sensitive skin and was exposed to cat dander while laying in bed with a friend. She states the rash blistered up and has been tingling and burning.     Patient complaining of possible UTI for symptoms of burning with urination, urinary urgency, urinary frequency a 'tad bit of abdominal pain, left kidney pain.  Patient denies hematuria, fever. she is c/o left flank pain; however that is also where pt has rash.    pt diagnosed with pneumonia in early April and did not have f/u CXR-is due for that.       Past Medical History  Disease Name Date Onset Notes   Anemia --  --    Arthritis --  --    Hypertension --  --    Type 1 diabetes mellitus --  --    Type 2 diabetes mellitus --  --    Vitamin D Deficiency --  --          Past Surgical History  Procedure Name Date Notes   Aspiration of thyroid cyst --  --    Hysterectomy --  total   Knee arthroscopy, diagnostic --  left   Partial thyroidectomy --  --          Medication List  Name Date Started Instructions   albuterol sulfate 90 mcg/actuation inhalation HFA aerosol inhaler 03/24/2021 inhale 1 puff (90 mcg) by inhalation route every 6 hours as needed for 30 days   ferrous sulfate 325 mg (65 mg iron) oral tablet  02/24/2021 take 1 tablet (325 mg) by oral route once daily for 90 days   Humulin N NPH Insulin KwikPen 100 unit/mL (3 mL) subcutaneous insulin pen 02/24/2021 inject 35 initsby subcutaneous route bid   Lasix 20 mg oral tablet 02/24/2021 one po TID prn   lisinopril 10 mg oral tablet 02/24/2021 take 1 tablet (10 mg) by oral route once daily for 30 days   metformin 500 mg oral tablet 02/24/2021 take 2 tablets (1,000 mg) by oral route 2 times per day with morning and evening meals for 30 days   NuLYTELY with Flavor Packs 420 gram oral recon soln 04/07/2021 take as directed   nystatin 100,000 unit/gram topical cream 02/24/2021 apply to the affected area(s) by topical route 2 times per day for 90 days   promethazine-DM 6.25-15 mg/5 mL oral syrup 03/31/2021 take 5 milliliters by oral route 2 times a day for 20 days   Vitamin D2 1,250 mcg (50,000 unit) oral capsule 02/24/2021 take 1 capsule by oral route once a week for 90 days         Allergy List  Allergen Name Date Reaction Notes   Flexeril --  --  --    Toradol --  --  --        Allergies Reconciled  Family Medical History  Disease Name Relative/Age Notes   Family history of heart disease Father/  Mother/   --    Family history of diabetes mellitus Mother/   --    Family history of emphysema Father/   --    FH: throat cancer Grandfather (paternal)/   --          Social History  Finding Status Start/Stop Quantity Notes   Alcohol Never --/-- --  --    Sedentary --  --/-- --  --    Tobacco Never --/-- --  --          Immunizations  NameDate Admin Mfg Trade Name Lot Number Route Inj VIS Given VIS Publication   Urnudytdb46/24/2021 [facility] FLUZONE-HIGH DOSE [id number] IM RD 02/24/2021    Comments: Patient tolerated         Review of Systems  · Constitutional  o Denies  o : fatigue  · Eyes  o Denies  o : blurred vision, changes in vision  · HENT  o Denies  o : headaches  · Cardiovascular  o Denies  o : chest pain, irregular heart beats, rapid heart rate, dyspnea on  "exertion  · Respiratory  o Denies  o : shortness of breath, wheezing, cough  · Gastrointestinal  o Denies  o : nausea, vomiting, diarrhea, constipation, abdominal pain, blood in stools, melena  · Genitourinary  o Admits  o : urgency, frequency, dysuria  o Denies  o : hematuria  · Integument  o Admits  o : rash  o Denies  o : new skin lesions  · Musculoskeletal  o Denies  o : joint pain, joint swelling, muscle pain  · Endocrine  o Denies  o : polyuria, polydipsia      Vitals  Date Time BP Position Site L\R Cuff Size HR RR TEMP (F) WT  HT  BMI kg/m2 BSA m2 O2 Sat FR L/min FiO2 HC       02/24/2021 08:33 /51 Sitting    90 - R  98.1 309lbs 2oz 5'  4\" 53.06 2.52 97 %  21%    03/24/2021 10:28 /52 Sitting    83 - R  97.7 311lbs 6oz 5'  3\" 55.16 2.51 96 %  21%    05/10/2021 02:27 /67 Sitting    89 - R  98.9 305lbs 8oz 5'  3\" 54.12 2.48 98 %  21%          Physical Examination  · Constitutional  o Appearance  o : well developed, well-nourished, no acute distress  · Head and Face  o Head  o : normocephalic, atraumatic  · Neck  o Inspection/Palpation  o : normal appearance, no masses or tenderness, trachea midline  o Thyroid  o : gland size normal, nontender, no nodules or masses present on palpation  · Respiratory  o Respiratory Effort  o : breathing unlabored  o Inspection of Chest  o : chest rise symmetric bilaterally  o Auscultation of Lungs  o : clear to auscultation bilaterally throughout inspiration and expiration  · Cardiovascular  o Heart  o :   § Auscultation of Heart  § : regular rate and rhythm, no murmurs, gallops or rubs  o Peripheral Vascular System  o :   § Extremities  § : no edema  · Lymphatic  o Neck  o : no cervical lymphadenopathy, no supraclavicular lymphadenopathy  · Psychiatric  o Mood and Affect  o : mood normal, affect appropriate     integumentary-left side  of abdomen radiating onto left hip and left flank area with dried, grouped lesions. no visible blisters, rash does not cross " midline of body.           Results  · In-Office Procedures  o Lab procedure  § IOP - Urinalysis without Microscopy (Clinitek) [facility] (26938)   § Color Ur: Yellow   § Clarity Ur: Clear   § Glucose Ur Ql Strip: Negative   § Bilirub Ur Ql Strip: Small   § Ketones Ur Ql Strip: 15 mg/dL   § Sp Gr Ur Qn: 1.030   § Hgb Ur Ql Strip: Trace-Intact   § pH Ur-LsCnc: 6.0   § Prot Ur Ql Strip: 100 mg/dL   § Urobilinogen Ur Strip-mCnc: 1.0 E.U./dL   § Nitrite Ur Ql Strip: Positive   § WBC Est Ur Ql Strip: Moderate       Assessment  · Abdominal pain     789.00/R10.9  · Urinary tract infection     599.0/N39.0  · Rash     782.1/R21  · Urinary frequency     788.41/R35.0  · Dysuria     788.1/R30.0  · Abnormal CXR (chest x-ray)     793.2/R93.89  recheck CXR  · Shingles rash     053.9/B02.9  rash is dried and crusted over, no treatment needed. discussed post-herpetic neuralgia signs and symptoms.    Problems Reconciled  Plan  · Orders  o Urine Culture (Clean Catch) (31437, 73569) - 599.0/N39.0 - 05/10/2021  o ACO-14: Influenza immunization administered or previously received [facility] () - - 05/10/2021  o ACO-39: Current medications updated and reviewed (1159F, ) - - 05/10/2021  o CXR PA/Lat [facility] (10797) - - 05/10/2021  · Medications  o Macrobid 100 mg oral capsule   SIG: take 1 capsule (100 mg) by oral route every 12 hours with food for 7 days   DISP: (14) Capsule with 0 refills  Prescribed on 05/10/2021     o Pyridium 200 mg oral tablet   SIG: take 1 tablet (200 mg) by oral route 3 times per day after meals as needed for 3 days   DISP: (9) Tablet with 0 refills  Prescribed on 05/10/2021     o Medications have been Reconciled  o Transition of Care or Provider Policy  · Instructions  o Instructed to seek medical attention urgently for new or worsening symptoms.  o Increase fluid intake. If you develop fever, chills, N/V, flank pain, or worsening of your symptoms return to the office or go to the ER.  o Take all medications  as prescribed/directed.  o Patient was educated/instructed on their diagnosis, treatment and medications prior to discharge from the clinic today.  o Patient instructed to seek medical attention urgently for new or worsening symptoms.  o Call the office with any concerns or questions.  · Disposition  o Follow Up in 1 month            Electronically Signed by: PARADISE Reza -Author on May 10, 2021 03:22:49 PM

## 2021-06-11 RX ORDER — FUROSEMIDE 20 MG/1
1 TABLET ORAL 3 TIMES DAILY
COMMUNITY
Start: 2021-03-23 | End: 2021-11-17 | Stop reason: ALTCHOICE

## 2021-06-11 RX ORDER — NYSTATIN 100000 U/G
CREAM TOPICAL
COMMUNITY
Start: 2021-04-20 | End: 2022-08-23 | Stop reason: SDUPTHER

## 2021-06-11 RX ORDER — FERROUS SULFATE 325(65) MG
325 TABLET ORAL
COMMUNITY
End: 2022-08-23 | Stop reason: SDDI

## 2021-06-11 RX ORDER — ALBUTEROL SULFATE 90 UG/1
1 AEROSOL, METERED RESPIRATORY (INHALATION) EVERY 6 HOURS
COMMUNITY
Start: 2021-03-24 | End: 2021-06-15

## 2021-06-11 RX ORDER — POLYETHYLENE GLYCOL 3350, SODIUM CHLORIDE, SODIUM BICARBONATE, POTASSIUM CHLORIDE 420; 11.2; 5.72; 1.48 G/4L; G/4L; G/4L; G/4L
POWDER, FOR SOLUTION ORAL
COMMUNITY
Start: 2021-04-07 | End: 2021-11-17

## 2021-06-11 RX ORDER — ERGOCALCIFEROL 1.25 MG/1
50000 CAPSULE ORAL WEEKLY
COMMUNITY
End: 2021-11-17 | Stop reason: SDUPTHER

## 2021-06-11 RX ORDER — LISINOPRIL 10 MG/1
10 TABLET ORAL DAILY
COMMUNITY
Start: 2021-04-01 | End: 2021-08-25 | Stop reason: SINTOL

## 2021-06-11 RX ORDER — INSULIN HUMAN 100 [IU]/ML
35 INJECTION, SUSPENSION SUBCUTANEOUS 2 TIMES DAILY
COMMUNITY
Start: 2021-04-20 | End: 2021-06-28 | Stop reason: SDUPTHER

## 2021-06-15 ENCOUNTER — OFFICE VISIT (OUTPATIENT)
Dept: FAMILY MEDICINE CLINIC | Facility: CLINIC | Age: 66
End: 2021-06-15

## 2021-06-15 VITALS
BODY MASS INDEX: 51.91 KG/M2 | HEIGHT: 63 IN | WEIGHT: 293 LBS | HEART RATE: 108 BPM | SYSTOLIC BLOOD PRESSURE: 126 MMHG | OXYGEN SATURATION: 98 % | DIASTOLIC BLOOD PRESSURE: 62 MMHG

## 2021-06-15 DIAGNOSIS — N39.0 CHRONIC UTI: ICD-10-CM

## 2021-06-15 DIAGNOSIS — I10 BENIGN HYPERTENSION: ICD-10-CM

## 2021-06-15 DIAGNOSIS — M19.90 ARTHRITIS: ICD-10-CM

## 2021-06-15 DIAGNOSIS — L98.9 SKIN LESION: ICD-10-CM

## 2021-06-15 DIAGNOSIS — Z87.440 HISTORY OF UTI: Primary | ICD-10-CM

## 2021-06-15 DIAGNOSIS — E66.01 MORBID OBESITY (HCC): ICD-10-CM

## 2021-06-15 PROBLEM — R01.1 HEART MURMUR: Status: ACTIVE | Noted: 2021-06-15

## 2021-06-15 PROBLEM — E11.9 TYPE 2 DIABETES MELLITUS: Status: ACTIVE | Noted: 2021-06-15

## 2021-06-15 PROBLEM — E55.9 VITAMIN D DEFICIENCY: Status: ACTIVE | Noted: 2021-06-15

## 2021-06-15 PROBLEM — G89.29 CHRONIC PAIN: Status: ACTIVE | Noted: 2021-06-15

## 2021-06-15 PROBLEM — E04.1 THYROID NODULE: Status: ACTIVE | Noted: 2021-06-15

## 2021-06-15 PROBLEM — R60.9 EDEMA: Status: ACTIVE | Noted: 2021-06-15

## 2021-06-15 LAB
BILIRUB BLD-MCNC: ABNORMAL MG/DL
CLARITY, POC: CLEAR
COLOR UR: YELLOW
GLUCOSE UR STRIP-MCNC: NEGATIVE MG/DL
KETONES UR QL: ABNORMAL
LEUKOCYTE EST, POC: ABNORMAL
NITRITE UR-MCNC: NEGATIVE MG/ML
PH UR: 6 [PH] (ref 5–8)
PROT UR STRIP-MCNC: ABNORMAL MG/DL
RBC # UR STRIP: NEGATIVE /UL
SP GR UR: 1.03 (ref 1–1.03)
UROBILINOGEN UR QL: ABNORMAL

## 2021-06-15 PROCEDURE — 87086 URINE CULTURE/COLONY COUNT: CPT | Performed by: NURSE PRACTITIONER

## 2021-06-15 PROCEDURE — 99213 OFFICE O/P EST LOW 20 MIN: CPT | Performed by: NURSE PRACTITIONER

## 2021-06-15 PROCEDURE — 81003 URINALYSIS AUTO W/O SCOPE: CPT | Performed by: NURSE PRACTITIONER

## 2021-06-15 RX ORDER — AMOXICILLIN AND CLAVULANATE POTASSIUM 875; 125 MG/1; MG/1
1 TABLET, FILM COATED ORAL 2 TIMES DAILY
Qty: 20 TABLET | Refills: 0 | Status: SHIPPED | OUTPATIENT
Start: 2021-06-15 | End: 2021-08-02

## 2021-06-15 NOTE — PROGRESS NOTES
Chief Complaint  Follow-up (Weight management and UTI)    Subjective          Anita Lemus presents to [facility]  History of Present Illness      Patient is here for a follow up on her UTI, she is not having any symptoms at this time.    Pt is also following up on her obesity and bariatric surgery pre-visit.  She has lost 13 pounds since April. This is her second visit. Her insurance is requiring 4 months of pre-visits prior to procedure.     Past Medical History:   Diagnosis Date   • Anemia    • Arthritis    • Hypertension    • Type 1 diabetes (CMS/HCC)    • Type 2 diabetes mellitus (CMS/HCC)    • Vitamin D deficiency          Allergies   Allergen Reactions   • Ketorolac Tromethamine Hives          Past Surgical History:   Procedure Laterality Date   • HYSTERECTOMY      total   • KNEE ARTHROSCOPY Left     diagnostic   • THYROID CYST EXCISION      aspiration   • THYROIDECTOMY, PARTIAL            Social History     Tobacco Use   • Smoking status: Never Smoker   • Smokeless tobacco: Never Used   Substance Use Topics   • Alcohol use: Never         Family History   Problem Relation Age of Onset   • Heart disease Mother    • Diabetes Mother    • Heart disease Father    • Emphysema Father    • Throat cancer Paternal Grandfather           Current Outpatient Medications on File Prior to Visit   Medication Sig   • ferrous sulfate 325 (65 FE) MG tablet Take 325 mg by mouth Daily With Breakfast.   • furosemide (LASIX) 20 MG tablet Take 1 tablet by mouth 3 (Three) Times a Day.   • HumuLIN N KwikPen 100 UNIT/ML injection Inject 35 Units under the skin into the appropriate area as directed 2 (two) times a day.   • Insulin Regular Human, Conc, (HumuLIN R) 500 UNIT/ML solution pen-injector CONCENTRATED injection Inject 5 Units under the skin into the appropriate area as directed 3 (Three) Times a Day Before Meals.   • lisinopril (PRINIVIL,ZESTRIL) 10 MG tablet Take 10 mg by mouth Daily.   •  "metFORMIN (GLUCOPHAGE) 500 MG tablet Take 2 tablets by mouth 2 (two) times a day.   • nystatin (MYCOSTATIN) 468143 UNIT/GM cream APPLY TO THE AFFECTED AREA TWICE A DAY   • polyethylene glycol-electrolytes (Nulytely Lemon-Lime) 420 g solution NuLYTELY with Flavor Packs 420 gram oral recon soln take as directed 4/7/2021  Active   • vitamin D (ERGOCALCIFEROL) 1.25 MG (36424 UT) capsule capsule Take 50,000 Units by mouth 1 (One) Time Per Week.   • [DISCONTINUED] albuterol sulfate  (90 Base) MCG/ACT inhaler Inhale 1 puff Every 6 (Six) Hours.     No current facility-administered medications on file prior to visit.         Immunization History   Administered Date(s) Administered   • COVID-19 (MODERNA) 05/10/2021, 06/04/2021   • Flu Vaccine Split Quad 02/21/2017, 02/21/2017   • Influenza TIV (IM) 10/15/2020, 02/24/2021   • Influenza, Unspecified 02/24/2021         /62   Pulse 108   Ht 160 cm (63\")   Wt 135 kg (298 lb 3.2 oz)   SpO2 98%   BMI 52.82 kg/m²             Physical Exam  Vitals reviewed.   Constitutional:       Appearance: Normal appearance. She is well-developed. She is obese.   HENT:      Head: Normocephalic and atraumatic.      Right Ear: External ear normal.      Left Ear: External ear normal.      Mouth/Throat:      Pharynx: No oropharyngeal exudate.   Eyes:      Conjunctiva/sclera: Conjunctivae normal.      Pupils: Pupils are equal, round, and reactive to light.   Cardiovascular:      Rate and Rhythm: Normal rate and regular rhythm.      Heart sounds: No murmur heard.   No friction rub. No gallop.    Pulmonary:      Effort: Pulmonary effort is normal.      Breath sounds: Normal breath sounds. No wheezing or rhonchi.   Skin:     General: Skin is warm and dry.   Neurological:      Mental Status: She is alert and oriented to person, place, and time.      Cranial Nerves: No cranial nerve deficit.   Psychiatric:         Mood and Affect: Mood and affect normal.         Behavior: Behavior normal.   "       Thought Content: Thought content normal.         Judgment: Judgment normal.             Result Review :                           Assessment and Plan      Diagnoses and all orders for this visit:    1. History of UTI (Primary)  -     POCT urinalysis dipstick, automated    2. Chronic UTI  -     Urine Culture - Urine, Urine, Clean Catch; Future    3. Arthritis    4. Benign hypertension    5. Morbid obesity (CMS/[facility])       Subsequent physical pre-visit paperwork completed and faxed to bariatric surgeon.              Follow Up     Return in about 3 months (around 9/15/2021) for med refills.    Patient was given instructions and counseling regarding her condition or for health maintenance advice. Please see specific information pulled into the AVS if appropriate.

## 2021-06-16 LAB — BACTERIA SPEC AEROBE CULT: NORMAL

## 2021-06-18 ENCOUNTER — OFFICE VISIT (OUTPATIENT)
Dept: SLEEP MEDICINE | Facility: HOSPITAL | Age: 66
End: 2021-06-18

## 2021-06-18 ENCOUNTER — TELEPHONE (OUTPATIENT)
Dept: FAMILY MEDICINE CLINIC | Facility: CLINIC | Age: 66
End: 2021-06-18

## 2021-06-18 VITALS
BODY MASS INDEX: 53.92 KG/M2 | OXYGEN SATURATION: 97 % | WEIGHT: 293 LBS | DIASTOLIC BLOOD PRESSURE: 72 MMHG | HEART RATE: 68 BPM | HEIGHT: 62 IN | SYSTOLIC BLOOD PRESSURE: 144 MMHG

## 2021-06-18 DIAGNOSIS — R06.83 SNORING: Primary | ICD-10-CM

## 2021-06-18 DIAGNOSIS — G47.33 OSA (OBSTRUCTIVE SLEEP APNEA): ICD-10-CM

## 2021-06-18 PROCEDURE — G0463 HOSPITAL OUTPT CLINIC VISIT: HCPCS

## 2021-06-18 RX ORDER — ZOLPIDEM TARTRATE 5 MG/1
5 TABLET ORAL
Qty: 2 TABLET | Refills: 0 | Status: SHIPPED | OUTPATIENT
Start: 2021-06-18 | End: 2021-06-18

## 2021-06-18 NOTE — PROGRESS NOTES
[facility]- [facility]      Patient Care Team:  Janet Camarillo APRN as PCP - General (Family Medicine)    Referring Provider: Allan Hamlin *    Chief complaint:   Referred for sleep apnea evaluation.    History of present illness:  Subjective   This is a 66 year old female patient with hx of DM II.   She's referred by Dr. Cali for sleep evaluation. She's currently in the process of getting evaluated for gastric sleeve surgery.     She actually denies issues sleeping.  She denies hypersomnia and reported sleeping throughout the night.  She also reported mild snoring.  However, she sleeps alone mostly.  She lives with her daughter who sleeps in a different room.  No dry mouth upon awakening or morning headache.    Sleep schedule:  -Bedtime: 9-11 PM  -Sleep latency: 30-60 minutes  -Wake up time: 6-8 AM, does feel refreshed  -Nocturnal awakening: None   -Perceived sleep hours: 7      ESS: Total score: 0     Review of Systems  Constitutional: No fever or chills. No changes in appetite.   ENMT: No sinus congestion, postnasal drip, hoarsness  Cardiovascular: No chest pain, palpitation or legs swelling.    Respiratory: No dyspnea, cough or wheezing.  Gastrointestinal: No constipation, diarrhea, abdominal pain or acid reflux  Neurology: No headache, weakness, numbness or dizziness.   Musculoskeletal: No joints pain, stiffness or swelling.   Psychiatry: No depression, anxiety or irritability.   Hem/Lymphatic: No swollen glands or easy bruising.  Integumentary: No rash.  Endocrinology: No excessive thirst, cold or warm intolerance.   Urinary: No dysuria, bloody urine or frequent urination.     History  Past Medical History:   Diagnosis Date   • Anemia    • Arthritis    • Hypertension    • Type 1 diabetes (CMS/HCC)    • Type 2 diabetes mellitus (CMS/HCC)    • Vitamin D deficiency    ,   Past Surgical History:   Procedure Laterality Date   • HYSTERECTOMY      total   • KNEE  ARTHROSCOPY Left     diagnostic   • THYROID CYST EXCISION      aspiration   • THYROIDECTOMY, PARTIAL     ,   Family History   Problem Relation Age of Onset   • Heart disease Mother    • Diabetes Mother    • Heart disease Father    • Emphysema Father    • Throat cancer Paternal Grandfather    ,   Social History     Socioeconomic History   • Marital status:      Spouse name: Not on file   • Number of children: Not on file   • Years of education: Not on file   • Highest education level: Not on file   Tobacco Use   • Smoking status: Never Smoker   • Smokeless tobacco: Never Used   Substance and Sexual Activity   • Alcohol use: Never       , (Not in a hospital admission)   and Allergies:  Ketorolac tromethamine    Medications:    Current Outpatient Medications:   •  amoxicillin-clavulanate (Augmentin) 875-125 MG per tablet, Take 1 tablet by mouth 2 (Two) Times a Day., Disp: 20 tablet, Rfl: 0  •  ferrous sulfate 325 (65 FE) MG tablet, Take 325 mg by mouth Daily With Breakfast., Disp: , Rfl:   •  furosemide (LASIX) 20 MG tablet, Take 1 tablet by mouth 3 (Three) Times a Day., Disp: , Rfl:   •  HumuLIN N KwikPen 100 UNIT/ML injection, Inject 35 Units under the skin into the appropriate area as directed 2 (two) times a day., Disp: , Rfl:   •  Insulin Regular Human, Conc, (HumuLIN R) 500 UNIT/ML solution pen-injector CONCENTRATED injection, Inject 5 Units under the skin into the appropriate area as directed 3 (Three) Times a Day Before Meals., Disp: , Rfl:   •  lisinopril (PRINIVIL,ZESTRIL) 10 MG tablet, Take 10 mg by mouth Daily., Disp: , Rfl:   •  metFORMIN (GLUCOPHAGE) 500 MG tablet, Take 2 tablets by mouth 2 (two) times a day., Disp: , Rfl:   •  nystatin (MYCOSTATIN) 640926 UNIT/GM cream, APPLY TO THE AFFECTED AREA TWICE A DAY, Disp: , Rfl:   •  polyethylene glycol-electrolytes (Nulytely Lemon-Lime) 420 g solution, NuLYTELY with Flavor Packs 420 gram oral recon soln take as directed 4/7/2021  Active, Disp: , Rfl:  "  •  vitamin D (ERGOCALCIFEROL) 1.25 MG (24151 UT) capsule capsule, Take 50,000 Units by mouth 1 (One) Time Per Week., Disp: , Rfl:       Objective   Vital Signs:  Vitals:    06/18/21 0900   BP: 144/72   BP Location: Left arm   Patient Position: Sitting   Pulse: 68   SpO2: 97%   Weight: 135 kg (298 lb)   Height: 157.5 cm (62\")     Body mass index is 54.5 kg/m².  Neck Circumference: 15.5 inches     Physical Exam:  Neck Circumference: 15.5 inches     Constitutional: Not in acute distress.  Eyes: Injected conjunctiva, EOMI. pupils equal reactive to light.  ENMT: Bishop score 4. Mallampati score 4. No oral thrush. Tonsils grade 1. Narrow distance in between the posterior pharyngeal pillars .  Neck: Large. No thyromegaly.  Trachea midline.  Thyroid scar.  Heart: Regular rhythm and rate, no murmur  Lungs: Good and equal air entry bilaterally. No crackles or wheezing.  Nonlabored breathing.       Abdomen: Obese.  Soft.  No tenderness.  Positive bowel sounds.  Extremities: No cyanosis, clubbing but legs edema.  Warm extremities and well-perfused.  Neuro: Conscious, alert, oriented x3.  Gait is normal.  Strength 5/5 in arms and legs.  Psych: Appropriate mood and affect.    Integumentary: No rash.  Normal skin turgor.  Lymphatic: No palpable cervical or supraclavicular lymph nodes.    Diagnostic data:  Echo 3/21/2021: EF 60%; grade 1 diastolic dysfunction; RV normal in size and function.  Labs 2/25/2021: Hemoglobin= 13; ferritin 147    Assessment   1. Snoring, probable sleep apnea  2. Super morbid obesity (BMI 54)  3. HTN  4. DM type II, insulin-dependent      Plan:  Check split-night PSG. We discussed the pathophysiology of sleep apnea, testing and therapy which include CPAP and weight loss.  Patient is agreeable with CPAP therapy if needed.  Despite being not much symptomatic per history, she is at very high risk for LISSETH due to morbid obesity and suggestive upper airway anatomy.    Counseled for weight loss.  Encouraged " to exercise regularly and cut down on carbohydrates.  Discussed that losing weight may decrease the severity of sleep apnea and obviate the need of CPAP therapy.    Discussed the association between obstructive sleep apnea and cardiovascular disease including HTN and the beneficial effect of Pap therapy in reducing the risk of major cardiovascular events.  Also discussed the importance of sleep apnea evaluation prior to bariatric surgery to improve the chance of success in the immediate and long-term postoperative.          Estefany Flores MD  06/18/21  09:57 EDT    This note was dictated utilizing Dragon dictation

## 2021-06-18 NOTE — TELEPHONE ENCOUNTER
----- Message from PARADISE Bateman sent at 6/18/2021  8:52 AM EDT -----  Urine contaminated, pt asymptomatic currently, if she becomes symptomatic she will need to RTO to leave urine sample

## 2021-06-28 ENCOUNTER — OFFICE VISIT (OUTPATIENT)
Dept: FAMILY MEDICINE CLINIC | Facility: CLINIC | Age: 66
End: 2021-06-28

## 2021-06-28 VITALS
OXYGEN SATURATION: 98 % | SYSTOLIC BLOOD PRESSURE: 133 MMHG | WEIGHT: 293 LBS | BODY MASS INDEX: 51.91 KG/M2 | DIASTOLIC BLOOD PRESSURE: 59 MMHG | HEIGHT: 63 IN | HEART RATE: 73 BPM

## 2021-06-28 DIAGNOSIS — E10.9 INSULIN DEPENDENT DIABETES MELLITUS TYPE IA (HCC): ICD-10-CM

## 2021-06-28 DIAGNOSIS — Z00.00 ENCOUNTER FOR MEDICARE ANNUAL WELLNESS EXAM: Primary | ICD-10-CM

## 2021-06-28 PROCEDURE — 1125F AMNT PAIN NOTED PAIN PRSNT: CPT | Performed by: NURSE PRACTITIONER

## 2021-06-28 PROCEDURE — G0438 PPPS, INITIAL VISIT: HCPCS | Performed by: NURSE PRACTITIONER

## 2021-06-28 PROCEDURE — 1159F MED LIST DOCD IN RCRD: CPT | Performed by: NURSE PRACTITIONER

## 2021-06-28 RX ORDER — INSULIN HUMAN 100 [IU]/ML
35 INJECTION, SUSPENSION SUBCUTANEOUS 2 TIMES DAILY
Qty: 15 EACH | Refills: 5 | Status: SHIPPED | OUTPATIENT
Start: 2021-06-28 | End: 2021-11-17 | Stop reason: ALTCHOICE

## 2021-06-28 NOTE — PROGRESS NOTES
The ABCs of the Annual Wellness Visit  Subsequent Medicare Wellness Visit    Chief Complaint   Patient presents with   • Medicare Wellness-subsequent       Subjective   History of Present Illness:  Anita Lemus is a 66 y.o. female who presents for a Subsequent Medicare Wellness Visit.    HEALTH RISK ASSESSMENT    Recent Hospitalizations:  No hospitalization(s) within the last year.    Current Medical Providers:  Patient Care Team:  Janet Camarillo APRN as PCP - General (Family Medicine)    Smoking Status:  Social History     Tobacco Use   Smoking Status Never Smoker   Smokeless Tobacco Never Used       Alcohol Consumption:  Social History     Substance and Sexual Activity   Alcohol Use Never       Depression Screen:   PHQ-2/PHQ-9 Depression Screening 6/15/2021   Little interest or pleasure in doing things 0   Feeling down, depressed, or hopeless 0   Total Score 0       Fall Risk Screen:  STEADI Fall Risk Assessment was completed, and patient is at LOW risk for falls.Assessment completed on:6/15/2021    Health Habits and Functional and Cognitive Screening:  No flowsheet data found.      Does the patient have evidence of cognitive impairment? No    Asprin use counseling:Does not need ASA (and currently is not on it)    Age-appropriate Screening Schedule:  Refer to the list below for future screening recommendations based on patient's age, sex and/or medical conditions. Orders for these recommended tests are listed in the plan section. The patient has been provided with a written plan.    Health Maintenance   Topic Date Due   • TDAP/TD VACCINES (1 - Tdap) Never done   • ZOSTER VACCINE (1 of 2) Never done   • DIABETIC EYE EXAM  Never done   • INFLUENZA VACCINE  08/01/2021   • HEMOGLOBIN A1C  08/25/2021   • URINE MICROALBUMIN  02/25/2022   • DIABETIC FOOT EXAM  03/15/2022   • MAMMOGRAM  03/31/2023   • DXA SCAN  05/06/2023          The following portions of the patient's history were reviewed and updated as  appropriate: allergies, current medications, past family history, past medical history, past social history, past surgical history and problem list.    Outpatient Medications Prior to Visit   Medication Sig Dispense Refill   • ferrous sulfate 325 (65 FE) MG tablet Take 325 mg by mouth Daily With Breakfast.     • furosemide (LASIX) 20 MG tablet Take 1 tablet by mouth 3 (Three) Times a Day.     • HumuLIN N KwikPen 100 UNIT/ML injection Inject 35 Units under the skin into the appropriate area as directed 2 (two) times a day.     • Insulin Regular Human, Conc, (HumuLIN R) 500 UNIT/ML solution pen-injector CONCENTRATED injection Inject 5 Units under the skin into the appropriate area as directed 3 (Three) Times a Day Before Meals.     • lisinopril (PRINIVIL,ZESTRIL) 10 MG tablet Take 10 mg by mouth Daily.     • metFORMIN (GLUCOPHAGE) 500 MG tablet Take 2 tablets by mouth 2 (two) times a day.     • nystatin (MYCOSTATIN) 568926 UNIT/GM cream APPLY TO THE AFFECTED AREA TWICE A DAY     • polyethylene glycol-electrolytes (Nulytely Lemon-Lime) 420 g solution NuLYTELY with Flavor Packs 420 gram oral recon soln take as directed 4/7/2021  Active     • vitamin D (ERGOCALCIFEROL) 1.25 MG (88803 UT) capsule capsule Take 50,000 Units by mouth 1 (One) Time Per Week.     • amoxicillin-clavulanate (Augmentin) 875-125 MG per tablet Take 1 tablet by mouth 2 (Two) Times a Day. 20 tablet 0     No facility-administered medications prior to visit.       Patient Active Problem List   Diagnosis   • Heart murmur   • Arthritis   • Benign hypertension   • Chronic pain   • Edema   • Morbid obesity (CMS/HCC)   • Thyroid nodule   • Type 2 diabetes mellitus (CMS/HCC)   • Vitamin D deficiency       Advanced Care Planning:  ACP discussion was declined by the patient. Patient does not have an advance directive, declines further assistance.    Review of Systems    Compared to one year ago, the patient feels her physical health is better.  Compared to one  "year ago, the patient feels her mental health is better.    Reviewed chart for potential of high risk medication in the elderly: yes  Reviewed chart for potential of harmful drug interactions in the elderly:yes    Objective         Vitals:    06/28/21 1045   BP: 133/59   Pulse: 73   SpO2: 98%   Weight: 133 kg (293 lb 3.2 oz)   Height: 160 cm (63\")   PainSc:   2       Body mass index is 51.94 kg/m².  Discussed the patient's BMI with her. The BMI is above average; BMI management plan is completed.    Physical Exam          Assessment/Plan   Medicare Risks and Personalized Health Plan  CMS Preventative Services Quick Reference  Breast Cancer/Mammogram Screening  Cardiovascular risk  Fall Risk  Obesity/Overweight   Osteoporosis Risk  Polypharmacy    The above risks/problems have been discussed with the patient.  Pertinent information has been shared with the patient in the After Visit Summary.  Follow up plans and orders are seen below in the Assessment/Plan Section.    Diagnoses and all orders for this visit:    1. Encounter for Medicare annual wellness exam (Primary)    2. Insulin dependent diabetes mellitus type IA (CMS/[facility])      Follow Up:  Patient has follow-up scheduled in 1 month.    An After Visit Summary and PPPS were given to the patient.               "

## 2021-07-01 DIAGNOSIS — E11.43 TYPE 2 DIABETES MELLITUS WITH DIABETIC AUTONOMIC NEUROPATHY, WITH LONG-TERM CURRENT USE OF INSULIN (HCC): Primary | ICD-10-CM

## 2021-07-01 DIAGNOSIS — Z79.4 TYPE 2 DIABETES MELLITUS WITH DIABETIC AUTONOMIC NEUROPATHY, WITH LONG-TERM CURRENT USE OF INSULIN (HCC): Primary | ICD-10-CM

## 2021-07-01 NOTE — TELEPHONE ENCOUNTER
Patient called re: needs refill on Humulin R.  Patient did not have Humulin R on her medication list, only Humulin N, which was sent to her pharm at her office appt.  Patient states she takes both and is completely out of the Humulin R.  I called [facility] to verify drug/dose.

## 2021-07-14 ENCOUNTER — TELEPHONE (OUTPATIENT)
Dept: FAMILY MEDICINE CLINIC | Facility: CLINIC | Age: 66
End: 2021-07-14

## 2021-07-14 NOTE — TELEPHONE ENCOUNTER
Caller: Anita Lemus    Relationship: Self    Best call back number: [phone]     What is the best time to reach you: ANY    What was the call regarding: PT CALLED TO GET A REFILL ON HER DOXICYLINE AND PYRIMIDIUM DUE TO A SUSPECTED UTI.  PLEASE ADVISE, THANK YOU.    Do you require a callback: YES

## 2021-07-14 NOTE — TELEPHONE ENCOUNTER
Called patient and let her know that she will have to have an appt to be checked for the UTI and she states that she will be in next week.

## 2021-07-15 VITALS
HEART RATE: 89 BPM | WEIGHT: 293 LBS | SYSTOLIC BLOOD PRESSURE: 143 MMHG | DIASTOLIC BLOOD PRESSURE: 67 MMHG | TEMPERATURE: 98.9 F | HEIGHT: 63 IN | BODY MASS INDEX: 51.91 KG/M2 | OXYGEN SATURATION: 98 %

## 2021-07-18 ENCOUNTER — HOSPITAL ENCOUNTER (OUTPATIENT)
Dept: SLEEP MEDICINE | Facility: HOSPITAL | Age: 66
End: 2021-07-18

## 2021-07-19 ENCOUNTER — TELEPHONE (OUTPATIENT)
Dept: FAMILY MEDICINE CLINIC | Facility: CLINIC | Age: 66
End: 2021-07-19

## 2021-07-19 NOTE — TELEPHONE ENCOUNTER
Caller: Anita Lemus    Relationship: Self    Best call back number: [phone]     Medication needed: PYRIDIUM    When do you need the refill by: ASAP    What additional details did the patient provide when requesting the medication: PATIENT STATES SHE IS COMPLETELY OUT OF THIS MEDICATION     Does the patient have less than a 3 day supply:  [x] Yes  [] No    What is the patient's preferred pharmacy: [facility] [address] [place], [place] [address] - [address] [facility] [phone] [facility] [phone]

## 2021-07-20 NOTE — TELEPHONE ENCOUNTER
Patient was advised at last phone call that Pydridum is for a UTI and that she has to be evaluated for that issue. She is being seen tomorrow.

## 2021-07-21 ENCOUNTER — OFFICE VISIT (OUTPATIENT)
Dept: FAMILY MEDICINE CLINIC | Facility: CLINIC | Age: 66
End: 2021-07-21

## 2021-07-21 VITALS
BODY MASS INDEX: 50.64 KG/M2 | WEIGHT: 285.8 LBS | HEART RATE: 84 BPM | DIASTOLIC BLOOD PRESSURE: 56 MMHG | OXYGEN SATURATION: 97 % | HEIGHT: 63 IN | SYSTOLIC BLOOD PRESSURE: 126 MMHG

## 2021-07-21 DIAGNOSIS — R35.0 URINARY FREQUENCY: ICD-10-CM

## 2021-07-21 DIAGNOSIS — N39.0 URINARY TRACT INFECTION WITHOUT HEMATURIA, SITE UNSPECIFIED: ICD-10-CM

## 2021-07-21 DIAGNOSIS — R30.0 BURNING WITH URINATION: Primary | ICD-10-CM

## 2021-07-21 DIAGNOSIS — Z71.89 ENCOUNTER FOR PRE-BARIATRIC SURGERY COUNSELING AND EDUCATION: ICD-10-CM

## 2021-07-21 LAB
BILIRUB BLD-MCNC: NEGATIVE MG/DL
CLARITY, POC: ABNORMAL
COLOR UR: ABNORMAL
GLUCOSE UR STRIP-MCNC: NEGATIVE MG/DL
KETONES UR QL: ABNORMAL
LEUKOCYTE EST, POC: ABNORMAL
NITRITE UR-MCNC: POSITIVE MG/ML
PH UR: 7 [PH] (ref 5–8)
PROT UR STRIP-MCNC: NEGATIVE MG/DL
RBC # UR STRIP: NEGATIVE /UL
SP GR UR: 1.02 (ref 1–1.03)
UROBILINOGEN UR QL: NORMAL

## 2021-07-21 PROCEDURE — 87086 URINE CULTURE/COLONY COUNT: CPT | Performed by: NURSE PRACTITIONER

## 2021-07-21 PROCEDURE — 87186 SC STD MICRODIL/AGAR DIL: CPT | Performed by: NURSE PRACTITIONER

## 2021-07-21 PROCEDURE — 81003 URINALYSIS AUTO W/O SCOPE: CPT | Performed by: NURSE PRACTITIONER

## 2021-07-21 PROCEDURE — 99213 OFFICE O/P EST LOW 20 MIN: CPT | Performed by: NURSE PRACTITIONER

## 2021-07-21 PROCEDURE — 87088 URINE BACTERIA CULTURE: CPT | Performed by: NURSE PRACTITIONER

## 2021-07-21 RX ORDER — DOXYCYCLINE 100 MG/1
TABLET ORAL
Qty: 20 TABLET | Refills: 0 | OUTPATIENT
Start: 2021-07-21

## 2021-07-21 RX ORDER — NITROFURANTOIN 25; 75 MG/1; MG/1
100 CAPSULE ORAL 2 TIMES DAILY
Qty: 20 CAPSULE | Refills: 0 | Status: SHIPPED | OUTPATIENT
Start: 2021-07-21 | End: 2021-11-17

## 2021-07-21 RX ORDER — PHENAZOPYRIDINE HYDROCHLORIDE 200 MG/1
200 TABLET, FILM COATED ORAL 3 TIMES DAILY PRN
Qty: 20 TABLET | Refills: 0 | Status: SHIPPED | OUTPATIENT
Start: 2021-07-21 | End: 2021-11-17

## 2021-07-21 NOTE — PROGRESS NOTES
Chief Complaint  Weight Check (Patient is working towards having weight loss surgery and is here for her monthly weigh in. ) and Urinary Tract Infection (Patient states that this UTI has came back again and she is having burning with urination, not fully being able to empty her bladder and stated that this started a week ago. )    Subjective          Anita Lemus presents to [facility]  Urinary Tract Infection   This is a recurrent problem. The current episode started 1 to 4 weeks ago. The problem occurs every urination. The problem has been unchanged. The quality of the pain is described as burning. The pain is moderate. There has been no fever. She is not sexually active. There is no history of pyelonephritis. Associated symptoms include chills and frequency. Associated symptoms comments: And urinary hesitancy. She has tried nothing for the symptoms. Her past medical history is significant for recurrent UTIs.         Patient is also here for her 3-month weight check for her upcoming bariatric surgery.  She is down 8 pounds since last visit 1 month ago.  She recently seen cardiology and is supposed to be having a stress test for her upcoming surgery.  She states she saw cardiology in [place] however wants to have the stress test in [place] so will probably need referral to cardiology locally.      Past Medical History:   Diagnosis Date   • Anemia    • Arthritis    • Hypertension    • Type 1 diabetes (CMS/HCC)    • Type 2 diabetes mellitus (CMS/HCC)    • Vitamin D deficiency          Allergies   Allergen Reactions   • Aspirin Nausea Only   • Cyclobenzaprine Irritability   • Ketorolac Tromethamine Hives          Past Surgical History:   Procedure Laterality Date   • HYSTERECTOMY      total   • KNEE ARTHROSCOPY Left     diagnostic   • THYROID CYST EXCISION      aspiration   • THYROIDECTOMY, PARTIAL            Social History     Tobacco Use   • Smoking status: Never Smoker   •  "Smokeless tobacco: Never Used   Substance Use Topics   • Alcohol use: Never         Family History   Problem Relation Age of Onset   • Heart disease Mother    • Diabetes Mother    • Heart disease Father    • Emphysema Father    • Throat cancer Paternal Grandfather           Current Outpatient Medications on File Prior to Visit   Medication Sig   • amoxicillin-clavulanate (Augmentin) 875-125 MG per tablet Take 1 tablet by mouth 2 (Two) Times a Day.   • ferrous sulfate 325 (65 FE) MG tablet Take 325 mg by mouth Daily With Breakfast.   • furosemide (LASIX) 20 MG tablet Take 1 tablet by mouth 3 (Three) Times a Day.   • HumuLIN N KwikPen 100 UNIT/ML injection Inject 35 Units under the skin into the appropriate area as directed 2 (two) times a day.   • insulin regular (humuLIN R,novoLIN R) 100 UNIT/ML injection Inject 5 Units under the skin into the appropriate area as directed 3 (Three) Times a Day Before Meals.   • lisinopril (PRINIVIL,ZESTRIL) 10 MG tablet Take 10 mg by mouth Daily.   • metFORMIN (GLUCOPHAGE) 500 MG tablet Take 2 tablets by mouth 2 (two) times a day.   • nystatin (MYCOSTATIN) 514803 UNIT/GM cream APPLY TO THE AFFECTED AREA TWICE A DAY   • polyethylene glycol-electrolytes (Nulytely Lemon-Lime) 420 g solution NuLYTELY with Flavor Packs 420 gram oral recon soln take as directed 4/7/2021  Active   • vitamin D (ERGOCALCIFEROL) 1.25 MG (23845 UT) capsule capsule Take 50,000 Units by mouth 1 (One) Time Per Week.     No current facility-administered medications on file prior to visit.         Immunization History   Administered Date(s) Administered   • COVID-19 (MODERNA) 05/10/2021, 06/04/2021   • Flu Vaccine Split Quad 02/21/2017, 02/21/2017   • Influenza TIV (IM) 10/15/2020, 02/24/2021   • Influenza, Unspecified 02/24/2021         /56   Pulse 84   Ht 160 cm (63\")   Wt 130 kg (285 lb 12.8 oz)   SpO2 97%   BMI 50.63 kg/m²             Physical Exam  Vitals reviewed.   Constitutional:       " Appearance: Normal appearance. She is well-developed. She is obese.   HENT:      Head: Normocephalic and atraumatic.      Right Ear: External ear normal.      Left Ear: External ear normal.      Mouth/Throat:      Pharynx: No oropharyngeal exudate.   Eyes:      Conjunctiva/sclera: Conjunctivae normal.      Pupils: Pupils are equal, round, and reactive to light.   Cardiovascular:      Rate and Rhythm: Normal rate and regular rhythm.      Heart sounds: Murmur heard.   No friction rub. No gallop.    Pulmonary:      Effort: Pulmonary effort is normal.      Breath sounds: Normal breath sounds. No wheezing or rhonchi.   Abdominal:      General: Bowel sounds are normal.   Skin:     General: Skin is warm and dry.   Neurological:      Mental Status: She is alert and oriented to person, place, and time.      Cranial Nerves: No cranial nerve deficit.   Psychiatric:         Mood and Affect: Mood and affect normal.         Behavior: Behavior normal.         Thought Content: Thought content normal.         Judgment: Judgment normal.             Result Review :     The following data was reviewed by: PARADISE Bateman on 07/21/2021:  Contains abnormal data POCT urinalysis dipstick, automated  Order: [id number]  Status:  Final result   Visible to patient:  No (not released)   Next appt:  07/27/2021 at 07:00 PM in Sleep Medicine ([facility] SLEEP ROOM 1)   Dx:  Urinary frequency  Specimen Information: Urine         0 Result Notes  Component   Ref Range & Units 08:48   Color   Yellow, Straw, Dark Yellow, Yue Dark Yellow    Clarity, UA   Clear CloudyAbnormal     Specific Gravity    1.005 - 1.030 1.020    pH, Urine   5.0 - 8.0 7.0    Leukocytes   Negative Moderate (2+)Abnormal     Nitrite, UA   Negative PositiveAbnormal     Protein, POC   Negative mg/dL Negative    Glucose, UA   Negative, 1000 mg/dL (3+) mg/dL Negative    Ketones, UA   Negative 15 mg/dLAbnormal     Urobilinogen, UA   Normal Normal    Bilirubin   Negative Negative     Blood, UA   Negative Negative    Resulting Agency [facility]         Specimen Collected: 07/21/21 08:48   Last Resulted: 07/21/21 08:48                               Assessment and Plan      Diagnoses and all orders for this visit:    1. Burning with urination (Primary)    2. Urinary frequency  -     POCT urinalysis dipstick, automated  -     Urine Culture - Urine, Urine, Clean Catch; Future  -     Ambulatory Referral to Urology    3. Urinary tract infection without hematuria, site unspecified  -     nitrofurantoin, macrocrystal-monohydrate, (Macrobid) 100 MG capsule; Take 1 capsule by mouth 2 (Two) Times a Day.  Dispense: 20 capsule; Refill: 0  -     phenazopyridine (Pyridium) 200 MG tablet; Take 1 tablet by mouth 3 (Three) Times a Day As Needed for Bladder Spasms.  Dispense: 20 tablet; Refill: 0  -     Ambulatory Referral to Urology    4. Encounter for pre-bariatric surgery counseling and education  -     Ambulatory Referral to Cardiology    Will refer to urology for chronic UTI symptoms.  4.  Obesity  Subsequent paperwork completed for patient's upcoming bariatric surgery.  Will fax to surgeon.  Will refer to cardiology for further stress testing.        Follow Up     Patient has follow-up appointment in 1 month for her final weight check prior to surgery.      Patient was given instructions and counseling regarding her condition or for health maintenance advice. Please see specific information pulled into the AVS if appropriate.

## 2021-07-23 LAB — BACTERIA SPEC AEROBE CULT: ABNORMAL

## 2021-07-27 ENCOUNTER — TRANSCRIBE ORDERS (OUTPATIENT)
Dept: ADMINISTRATIVE | Facility: HOSPITAL | Age: 66
End: 2021-07-27

## 2021-07-27 ENCOUNTER — HOSPITAL ENCOUNTER (OUTPATIENT)
Dept: SLEEP MEDICINE | Facility: HOSPITAL | Age: 66
Discharge: HOME | End: 2021-07-27
Admitting: INTERNAL MEDICINE

## 2021-07-27 DIAGNOSIS — Z01.810 PRE-OPERATIVE CARDIOVASCULAR EXAMINATION: Primary | ICD-10-CM

## 2021-07-27 DIAGNOSIS — G47.33 OSA (OBSTRUCTIVE SLEEP APNEA): ICD-10-CM

## 2021-07-27 PROCEDURE — 95810 POLYSOM 6/> YRS 4/> PARAM: CPT

## 2021-07-28 ENCOUNTER — TELEPHONE (OUTPATIENT)
Dept: FAMILY MEDICINE CLINIC | Facility: CLINIC | Age: 66
End: 2021-07-28

## 2021-08-02 ENCOUNTER — OFFICE VISIT (OUTPATIENT)
Dept: CARDIOLOGY | Facility: CLINIC | Age: 66
End: 2021-08-02

## 2021-08-02 VITALS
BODY MASS INDEX: 50.42 KG/M2 | WEIGHT: 274 LBS | SYSTOLIC BLOOD PRESSURE: 117 MMHG | DIASTOLIC BLOOD PRESSURE: 69 MMHG | HEIGHT: 62 IN | HEART RATE: 81 BPM

## 2021-08-02 DIAGNOSIS — E66.01 MORBID (SEVERE) OBESITY DUE TO EXCESS CALORIES (HCC): Primary | ICD-10-CM

## 2021-08-02 DIAGNOSIS — I10 ESSENTIAL HYPERTENSION: ICD-10-CM

## 2021-08-02 DIAGNOSIS — Z01.810 PREOP CARDIOVASCULAR EXAM: ICD-10-CM

## 2021-08-02 DIAGNOSIS — E78.2 MIXED HYPERLIPIDEMIA: ICD-10-CM

## 2021-08-02 PROCEDURE — 93000 ELECTROCARDIOGRAM COMPLETE: CPT | Performed by: INTERNAL MEDICINE

## 2021-08-02 PROCEDURE — 99203 OFFICE O/P NEW LOW 30 MIN: CPT | Performed by: INTERNAL MEDICINE

## 2021-08-02 NOTE — PROGRESS NOTES
Chief Complaint  Hypertension (medical clearance for Bariatric surgery)    Subjective            Anita Lemus presents to [facility]  History of Present Illness    66-year-old white female, she has no previous cardiac history.  She has a history of morbid obesity, she has been evaluated and being determined for gastric sleeve procedure.  She has a history of hypertension and recently diagnosed dyslipidemia and was recently started on statin therapy but she cannot currently recall that agent.  She denies chest pain or excessive shortness of breath.  She is a non-smoker.    PMH  Past Medical History:   Diagnosis Date   • Anemia    • Arthritis    • Hypertension    • Type 1 diabetes (CMS/HCC)    • Type 2 diabetes mellitus (CMS/HCC)    • Vitamin D deficiency          SURGICALHX  Past Surgical History:   Procedure Laterality Date   • HYSTERECTOMY      total   • KNEE ARTHROSCOPY Left     diagnostic   • THYROID CYST EXCISION      aspiration   • THYROIDECTOMY, PARTIAL          SOC  Social History     Socioeconomic History   • Marital status:      Spouse name: Not on file   • Number of children: Not on file   • Years of education: Not on file   • Highest education level: Not on file   Tobacco Use   • Smoking status: Never Smoker   • Smokeless tobacco: Never Used   Vaping Use   • Vaping Use: Never used   Substance and Sexual Activity   • Alcohol use: Never   • Drug use: Never         FAMHX  Family History   Problem Relation Age of Onset   • Heart disease Mother    • Diabetes Mother    • Heart disease Father    • Emphysema Father    • Throat cancer Paternal Grandfather           ALLERGY  Allergies   Allergen Reactions   • Aspirin Nausea Only   • Cyclobenzaprine Irritability   • Ketorolac Tromethamine Hives        MEDSCURRENT    Current Outpatient Medications:   •  ferrous sulfate 325 (65 FE) MG tablet, Take 325 mg by mouth Daily With Breakfast., Disp: , Rfl:   •  furosemide (LASIX) 20 MG  "tablet, Take 1 tablet by mouth 3 (Three) Times a Day., Disp: , Rfl:   •  HumuLIN N KwikPen 100 UNIT/ML injection, Inject 35 Units under the skin into the appropriate area as directed 2 (two) times a day., Disp: 15 each, Rfl: 5  •  insulin regular (humuLIN R,novoLIN R) 100 UNIT/ML injection, Inject 5 Units under the skin into the appropriate area as directed 3 (Three) Times a Day Before Meals., Disp: 1 each, Rfl: 1  •  lisinopril (PRINIVIL,ZESTRIL) 10 MG tablet, Take 10 mg by mouth Daily., Disp: , Rfl:   •  metFORMIN (GLUCOPHAGE) 500 MG tablet, Take 2 tablets by mouth 2 (two) times a day., Disp: , Rfl:   •  nystatin (MYCOSTATIN) 375314 UNIT/GM cream, APPLY TO THE AFFECTED AREA TWICE A DAY, Disp: , Rfl:   •  phenazopyridine (Pyridium) 200 MG tablet, Take 1 tablet by mouth 3 (Three) Times a Day As Needed for Bladder Spasms., Disp: 20 tablet, Rfl: 0  •  vitamin D (ERGOCALCIFEROL) 1.25 MG (11235 UT) capsule capsule, Take 50,000 Units by mouth 1 (One) Time Per Week., Disp: , Rfl:   •  nitrofurantoin, macrocrystal-monohydrate, (Macrobid) 100 MG capsule, Take 1 capsule by mouth 2 (Two) Times a Day., Disp: 20 capsule, Rfl: 0  •  polyethylene glycol-electrolytes (Nulytely Lemon-Lime) 420 g solution, NuLYTELY with Flavor Packs 420 gram oral recon soln take as directed 4/7/2021  Active, Disp: , Rfl:       Review of Systems   Constitutional: Negative.   HENT: Negative.    Eyes: Negative.    Cardiovascular: Positive for dyspnea on exertion. Negative for chest pain.   Respiratory: Positive for shortness of breath.    Endocrine: Negative.    Hematologic/Lymphatic: Negative.    Skin: Negative.    Musculoskeletal: Positive for joint pain.   Gastrointestinal: Negative.    Genitourinary: Negative.    Neurological: Negative.    Psychiatric/Behavioral: Negative.         Objective     /69 (BP Location: Left arm)   Pulse 81   Ht 157.5 cm (62\")   Wt 124 kg (274 lb)   BMI 50.12 kg/m²       General Appearance:   · well " developed  · well nourished, morbidly obese  HENT:   · oropharynx moist  · lips not cyanotic  Neck:  · thyroid not enlarged  · supple  Respiratory:  · no respiratory distress  · normal breath sounds  · no rales  Cardiovascular:  · no jugular venous distention  · regular rhythm  · apical impulse normal  · S1 normal, S2 normal  · no S3, no S4   · Soft parasternal systolic murmur  · no rub, no thrill  · carotid pulses normal; no bruit  · pedal pulses normal  · lower extremity edema: Mild  Musculoskeletal:  · no clubbing of fingers.   · normocephalic, head atraumatic  Skin:   · warm, dry  Psychiatric:  · judgement and insight appropriate  · normal mood and affect      Result Review :     The following data was reviewed by: Mono Sanches MD on 08/02/2021:    CMP    CMP 11/20/20 2/25/21   Glucose 163 (A) 335 (A)   BUN 16 12   Creatinine 1.07 (A) 0.87   Sodium 138 129 (A)   Potassium 4.2 4.3   Chloride 99 95 (A)   Calcium 9.8 9.2   Albumin 3.8 3.5   Total Bilirubin 0.62 0.73   Alkaline Phosphatase 113 132   AST (SGOT) 15 10 (A)   ALT (SGPT) 10 11   (A) Abnormal value       Comments are available for some flowsheets but are not being displayed.           CBC    CBC 11/20/20 2/25/21   WBC 8.75 10.52   RBC 4.73 4.54   Hemoglobin 13.1 13.2   Hematocrit 40.8 40.0   MCV 86.3 88.1   MCH 27.7 29.1   MCHC 32.1 (A) 33.0   RDW 16.0 (A) 15.5 (A)   Platelets 209 223   (A) Abnormal value            Lipid Panel    Lipid Panel 11/20/20 2/25/21   Total Cholesterol 170 187   Triglycerides 100 103   HDL Cholesterol 44 47   VLDL Cholesterol 20 21   LDL Cholesterol  106 (A) 119 (A)   (A) Abnormal value       Comments are available for some flowsheets but are not being displayed.                 Data reviewed: Cardiology studies Echo reviewed from March, normal biventricular function, grade 1 diastolic dysfunction, sclerotic aortic valve changes no stenosis       ECG 12 Lead    Date/Time: 8/2/2021 4:12 PM  Performed by: FABIÁN Sanches  MD Sergey  Authorized by: FABIÁN Sanches MD   Previous ECG: no previous ECG available  Rhythm: sinus rhythm  Conduction: non-specific intraventricular conduction delay  ST Segments: ST segments normal  T Waves: T waves normal  QRS axis: normal  Other: no other findings    Clinical impression: normal ECG            Patient's Body mass index is 50.12 kg/m². indicating that she is obese (BMI >30). Obesity-related health conditions include the following: hypertension. Obesity is improving with treatment. BMI is is above average; BMI management plan is completed. We discussed portion control and Proceeding with gastric sleeve surgery..           Assessment and Plan        ASSESSMENT:  Encounter Diagnoses   Name Primary?   • Morbid (severe) obesity due to excess calories (CMS/HCC) Yes   • Essential hypertension    • Mixed hyperlipidemia    • Preop cardiovascular exam          PLAN:    1.  Based on the patient's history, planned procedure and clinical stability she is at relatively low risk for cardiac complications from general anesthesia.  Proceed with gastric sleeve with no cardiac contraindications at this point  2.  Weight loss counseling, ongoing  3.  Continue antihypertensive regimen  4.  Based on her current parameters her 10-year estimated cardiovascular risk is 13.4% which is higher risk.  Once she is through gastric sleeve surgery I recommend once daily low-dose aspirin for prevention purposes.  And I agree with her LDL being treated, which she was just started on a statin she cannot remember the name, goal LDL in her case should be towards 70    She will be followed as needed if problems arise in the future          Patient was given instructions and counseling regarding her condition or for health maintenance advice. Please see specific information pulled into the AVS if appropriate.             FABIÁN Sanches MD  8/2/2021    16:08 EDT

## 2021-08-02 NOTE — TELEPHONE ENCOUNTER
Patient states that she is feeling better. Patient states that she is having a cough and see the sleep study doctor and that they think is related to the lisinopril.

## 2021-08-02 NOTE — TELEPHONE ENCOUNTER
Cannot: Antibiotics, patient will need to RTO and leave another urine sample.  She does not need an appointment for this.

## 2021-08-09 ENCOUNTER — HOSPITAL ENCOUNTER (EMERGENCY)
Facility: HOSPITAL | Age: 66
Discharge: HOME | End: 2021-08-09
Attending: EMERGENCY MEDICINE | Admitting: EMERGENCY MEDICINE

## 2021-08-09 VITALS
OXYGEN SATURATION: 100 % | HEART RATE: 71 BPM | BODY MASS INDEX: 50.42 KG/M2 | SYSTOLIC BLOOD PRESSURE: 99 MMHG | DIASTOLIC BLOOD PRESSURE: 62 MMHG | RESPIRATION RATE: 18 BRPM | HEIGHT: 62 IN | WEIGHT: 274 LBS | TEMPERATURE: 97.9 F

## 2021-08-09 DIAGNOSIS — E16.2 HYPOGLYCEMIA: Primary | ICD-10-CM

## 2021-08-09 DIAGNOSIS — I48.91 ATRIAL FIBRILLATION, UNSPECIFIED TYPE (HCC): ICD-10-CM

## 2021-08-09 LAB
ALBUMIN SERPL-MCNC: 3.5 G/DL (ref 3.5–5.2)
ALBUMIN/GLOB SERPL: 1.4 G/DL
ALP SERPL-CCNC: 81 U/L (ref 39–117)
ALT SERPL W P-5'-P-CCNC: 11 U/L (ref 1–33)
ANION GAP SERPL CALCULATED.3IONS-SCNC: 8.6 MMOL/L (ref 5–15)
AST SERPL-CCNC: 18 U/L (ref 1–32)
BASOPHILS # BLD AUTO: 0.02 10*3/MM3 (ref 0–0.2)
BASOPHILS NFR BLD AUTO: 0.3 % (ref 0–1.5)
BILIRUB SERPL-MCNC: 0.6 MG/DL (ref 0–1.2)
BUN SERPL-MCNC: 14 MG/DL (ref 8–23)
BUN/CREAT SERPL: 18.2 (ref 7–25)
CALCIUM SPEC-SCNC: 9 MG/DL (ref 8.6–10.5)
CHLORIDE SERPL-SCNC: 106 MMOL/L (ref 98–107)
CO2 SERPL-SCNC: 28.4 MMOL/L (ref 22–29)
CREAT SERPL-MCNC: 0.77 MG/DL (ref 0.57–1)
DEPRECATED RDW RBC AUTO: 51.7 FL (ref 37–54)
EOSINOPHIL # BLD AUTO: 0.08 10*3/MM3 (ref 0–0.4)
EOSINOPHIL NFR BLD AUTO: 1.1 % (ref 0.3–6.2)
ERYTHROCYTE [DISTWIDTH] IN BLOOD BY AUTOMATED COUNT: 14.9 % (ref 12.3–15.4)
GFR SERPL CREATININE-BSD FRML MDRD: 75 ML/MIN/1.73
GLOBULIN UR ELPH-MCNC: 2.5 GM/DL
GLUCOSE BLDC GLUCOMTR-MCNC: 100 MG/DL (ref 70–99)
GLUCOSE SERPL-MCNC: 108 MG/DL (ref 65–99)
HCT VFR BLD AUTO: 41.6 % (ref 34–46.6)
HGB BLD-MCNC: 13.5 G/DL (ref 12–15.9)
IMM GRANULOCYTES # BLD AUTO: 0.02 10*3/MM3 (ref 0–0.05)
IMM GRANULOCYTES NFR BLD AUTO: 0.3 % (ref 0–0.5)
LYMPHOCYTES # BLD AUTO: 0.76 10*3/MM3 (ref 0.7–3.1)
LYMPHOCYTES NFR BLD AUTO: 10.8 % (ref 19.6–45.3)
MAGNESIUM SERPL-MCNC: 1.6 MG/DL (ref 1.6–2.4)
MCH RBC QN AUTO: 30.3 PG (ref 26.6–33)
MCHC RBC AUTO-ENTMCNC: 32.5 G/DL (ref 31.5–35.7)
MCV RBC AUTO: 93.5 FL (ref 79–97)
MONOCYTES # BLD AUTO: 0.34 10*3/MM3 (ref 0.1–0.9)
MONOCYTES NFR BLD AUTO: 4.8 % (ref 5–12)
NEUTROPHILS NFR BLD AUTO: 5.8 10*3/MM3 (ref 1.7–7)
NEUTROPHILS NFR BLD AUTO: 82.7 % (ref 42.7–76)
NRBC BLD AUTO-RTO: 0 /100 WBC (ref 0–0.2)
PLATELET # BLD AUTO: 155 10*3/MM3 (ref 140–450)
PMV BLD AUTO: 9.6 FL (ref 6–12)
POTASSIUM SERPL-SCNC: 3.7 MMOL/L (ref 3.5–5.2)
PROT SERPL-MCNC: 6 G/DL (ref 6–8.5)
RBC # BLD AUTO: 4.45 10*6/MM3 (ref 3.77–5.28)
SODIUM SERPL-SCNC: 143 MMOL/L (ref 136–145)
WBC # BLD AUTO: 7.02 10*3/MM3 (ref 3.4–10.8)

## 2021-08-09 PROCEDURE — 85025 COMPLETE CBC W/AUTO DIFF WBC: CPT | Performed by: EMERGENCY MEDICINE

## 2021-08-09 PROCEDURE — 80053 COMPREHEN METABOLIC PANEL: CPT | Performed by: EMERGENCY MEDICINE

## 2021-08-09 PROCEDURE — 93005 ELECTROCARDIOGRAM TRACING: CPT | Performed by: EMERGENCY MEDICINE

## 2021-08-09 PROCEDURE — 99283 EMERGENCY DEPT VISIT LOW MDM: CPT

## 2021-08-09 PROCEDURE — 82962 GLUCOSE BLOOD TEST: CPT

## 2021-08-09 PROCEDURE — 83735 ASSAY OF MAGNESIUM: CPT | Performed by: EMERGENCY MEDICINE

## 2021-08-09 RX ORDER — RIVAROXABAN 15 MG-20MG
KIT ORAL
Qty: 1 EACH | Refills: 0 | Status: SHIPPED | OUTPATIENT
Start: 2021-08-09 | End: 2021-11-17 | Stop reason: ALTCHOICE

## 2021-08-10 LAB — QT INTERVAL: 408 MS

## 2021-08-10 NOTE — ED PROVIDER NOTES
Time: 8:49 PM EDT  Arrived by: EMS  Chief Complaint:   Chief Complaint   Patient presents with   • Hypoglycemia     History provided by: Patient  History is limited by: N/A     History of Present Illness:  Patient is a 66 y.o. year old female that presents to the emergency department with hypoglycemia.  She states that she took too much of her insulin.  She states that she had a little extra food and thought that taking more would be appropriate.  Patient denies chest pain or shortness of breath.  Patient has no subjective neurological deficit including numbness, tingling, fecal incontinence, urinary incontinence, or motor deficit.  Patient denies dysuria and urinary frequency.  Patient denies abdominal pain.  Patient denies headache and denies head trauma.  She was given D50 via EMS and reports that she feels significant relief of her symptoms.      Hypoglycemia  Severity:  Mild  Onset quality:  Sudden  Duration:  1 hour  Timing:  Constant  Progression:  Resolved  Chronicity:  New  Diabetic status:  Controlled with insulin  Time since last antidiabetic medication:  2 hours  Relieved by:  IV glucose  Ineffective treatments:  None tried  Associated symptoms: altered mental status, decreased responsiveness and weakness    Associated symptoms: no dizziness, no seizures, no shortness of breath, no sweats, no tremors, no visual change and no vomiting    Weakness:     Severity:  Mild    Onset quality:  Sudden    Duration:  20 minutes    Timing:  Constant    Progression:  Resolved    Chronicity:  New      Similar Symptoms Previously: no  Recently seen: no      Patient Care Team  Primary Care Provider: Janet Camarillo APRN    Past Medical History:     Allergies   Allergen Reactions   • Aspirin Nausea Only   • Cyclobenzaprine Irritability   • Ketorolac Tromethamine Hives     Past Medical History:   Diagnosis Date   • Anemia    • Arthritis    • Hypertension    • Type 1 diabetes (CMS/[facility])    • Type 2 diabetes mellitus  (CMS/[facility])    • Vitamin D deficiency      Past Surgical History:   Procedure Laterality Date   • HYSTERECTOMY      total   • KNEE ARTHROSCOPY Left     diagnostic   • THYROID CYST EXCISION      aspiration   • THYROIDECTOMY, PARTIAL       Family History   Problem Relation Age of Onset   • Heart disease Mother    • Diabetes Mother    • Heart disease Father    • Emphysema Father    • Throat cancer Paternal Grandfather        Home Medications:  Prior to Admission medications    Medication Sig Start Date End Date Taking? Authorizing Provider   ferrous sulfate 325 (65 FE) MG tablet Take 325 mg by mouth Daily With Breakfast.    Mirella Mccarty MD   furosemide (LASIX) 20 MG tablet Take 1 tablet by mouth 3 (Three) Times a Day. 3/23/21   Mirella Mccarty MD   HumuLIN N KwikPen 100 UNIT/ML injection Inject 35 Units under the skin into the appropriate area as directed 2 (two) times a day. 6/28/21   Janet Camarillo APRN   insulin regular (humuLIN R,novoLIN R) 100 UNIT/ML injection Inject 5 Units under the skin into the appropriate area as directed 3 (Three) Times a Day Before Meals. 7/1/21   Fara Shea APRN   lisinopril (PRINIVIL,ZESTRIL) 10 MG tablet Take 10 mg by mouth Daily. 4/1/21   Mirella Mccarty MD   metFORMIN (GLUCOPHAGE) 500 MG tablet Take 2 tablets by mouth 2 (two) times a day. 3/23/21   Mirella Mccarty MD   nitrofurantoin, macrocrystal-monohydrate, (Macrobid) 100 MG capsule Take 1 capsule by mouth 2 (Two) Times a Day. 7/21/21   Janet Camarillo APRN   nystatin (MYCOSTATIN) 286359 UNIT/GM cream APPLY TO THE AFFECTED AREA TWICE A DAY 4/20/21   Mirella Mccarty MD   phenazopyridine (Pyridium) 200 MG tablet Take 1 tablet by mouth 3 (Three) Times a Day As Needed for Bladder Spasms. 7/21/21   Janet Camarillo APRN   polyethylene glycol-electrolytes (Nulytely Lemon-Lime) 420 g solution NuLYTELY with Flavor Packs 420 gram oral recon soln take as directed 4/7/2021  Active 4/7/21    "ProviderMirella MD   vitamin D (ERGOCALCIFEROL) 1.25 MG (74414 UT) capsule capsule Take 50,000 Units by mouth 1 (One) Time Per Week.    ProviderMirella MD        Social History:   Social History     Tobacco Use   • Smoking status: Never Smoker   • Smokeless tobacco: Never Used   Vaping Use   • Vaping Use: Never used   Substance Use Topics   • Alcohol use: Never   • Drug use: Never     Recent travel: no     Review of Systems:  Review of Systems   Constitutional: Positive for decreased responsiveness. Negative for chills, diaphoresis and fever.   HENT: Negative for congestion, rhinorrhea and sore throat.    Eyes: Negative for pain and visual disturbance.   Respiratory: Negative for apnea, cough, chest tightness and shortness of breath.    Cardiovascular: Negative for chest pain and palpitations.   Gastrointestinal: Negative for abdominal pain, diarrhea, nausea and vomiting.   Genitourinary: Negative for difficulty urinating and dysuria.   Musculoskeletal: Negative for joint swelling and myalgias.   Skin: Negative for color change.   Neurological: Positive for weakness. Negative for dizziness, tremors, seizures and headaches.   All other systems reviewed and are negative.       Physical Exam:  BP 99/62   Pulse 71   Temp 97.9 °F (36.6 °C) (Oral)   Resp 18   Ht 157.5 cm (62\")   Wt 124 kg (274 lb) Comment: no bed scale  SpO2 100%   Breastfeeding No   BMI 50.12 kg/m²     Physical Exam  Vitals and nursing note reviewed.   Constitutional:       General: She is not in acute distress.     Appearance: Normal appearance. She is not toxic-appearing.   HENT:      Head: Normocephalic and atraumatic.      Jaw: There is normal jaw occlusion.   Eyes:      General: Lids are normal.      Extraocular Movements: Extraocular movements intact.      Conjunctiva/sclera: Conjunctivae normal.      Pupils: Pupils are equal, round, and reactive to light.   Cardiovascular:      Rate and Rhythm: Normal rate and regular rhythm. "      Pulses: Normal pulses.      Heart sounds: Normal heart sounds.   Pulmonary:      Effort: Pulmonary effort is normal. No respiratory distress.      Breath sounds: Normal breath sounds. No wheezing or rhonchi.   Abdominal:      General: Abdomen is flat.      Palpations: Abdomen is soft.      Tenderness: There is no abdominal tenderness. There is no guarding or rebound.   Musculoskeletal:         General: Normal range of motion.      Cervical back: Normal range of motion and neck supple.      Right lower leg: No edema.      Left lower leg: No edema.   Skin:     General: Skin is warm and dry.   Neurological:      Mental Status: She is alert and oriented to person, place, and time. Mental status is at baseline.   Psychiatric:         Mood and Affect: Mood normal.                Medications in the Emergency Department:  Medications - No data to display     Labs  Lab Results (last 24 hours)     Procedure Component Value Units Date/Time    POC Glucose Once [[id number]]  (Abnormal) Collected: 08/09/21 2051    Specimen: Blood Updated: 08/09/21 2052     Glucose 100 mg/dL      Comment: Serial Number: [id number]:  [id number]       CBC & Differential [[id number]]  (Abnormal) Collected: 08/09/21 2055    Specimen: Blood Updated: 08/09/21 2107    Narrative:      The following orders were created for panel order CBC & Differential.  Procedure                               Abnormality         Status                     ---------                               -----------         ------                     CBC Auto Differential[[id number]]        Abnormal            Final result                 Please view results for these tests on the individual orders.    Comprehensive Metabolic Panel [[id number]]  (Abnormal) Collected: 08/09/21 2055    Specimen: Blood Updated: 08/09/21 2132     Glucose 108 mg/dL      BUN 14 mg/dL      Creatinine 0.77 mg/dL      Sodium 143 mmol/L      Potassium 3.7 mmol/L      Chloride 106 mmol/L      CO2 28.4  mmol/L      Calcium 9.0 mg/dL      Total Protein 6.0 g/dL      Albumin 3.50 g/dL      ALT (SGPT) 11 U/L      AST (SGOT) 18 U/L      Alkaline Phosphatase 81 U/L      Total Bilirubin 0.6 mg/dL      eGFR Non African Amer 75 mL/min/1.73      Globulin 2.5 gm/dL      A/G Ratio 1.4 g/dL      BUN/Creatinine Ratio 18.2     Anion Gap 8.6 mmol/L     Narrative:      GFR Normal >60  Chronic Kidney Disease <60  Kidney Failure <15      Magnesium [[id number]]  (Normal) Collected: 08/09/21 2055    Specimen: Blood Updated: 08/09/21 2132     Magnesium 1.6 mg/dL     CBC Auto Differential [[id number]]  (Abnormal) Collected: 08/09/21 2055    Specimen: Blood Updated: 08/09/21 2107     WBC 7.02 10*3/mm3      RBC 4.45 10*6/mm3      Hemoglobin 13.5 g/dL      Hematocrit 41.6 %      MCV 93.5 fL      MCH 30.3 pg      MCHC 32.5 g/dL      RDW 14.9 %      RDW-SD 51.7 fl      MPV 9.6 fL      Platelets 155 10*3/mm3      Neutrophil % 82.7 %      Lymphocyte % 10.8 %      Monocyte % 4.8 %      Eosinophil % 1.1 %      Basophil % 0.3 %      Immature Grans % 0.3 %      Neutrophils, Absolute 5.80 10*3/mm3      Lymphocytes, Absolute 0.76 10*3/mm3      Monocytes, Absolute 0.34 10*3/mm3      Eosinophils, Absolute 0.08 10*3/mm3      Basophils, Absolute 0.02 10*3/mm3      Immature Grans, Absolute 0.02 10*3/mm3      nRBC 0.0 /100 WBC            Imaging:  No Radiology Exams Resulted Within Past 24 Hours    Procedures:  Procedures    Progress     EKG:    Rhythm: atrial fibrillatoin  Rate: 92  T-wave: normal  Intervals: normal  ST Segment: no elevations    EKG Comparison: changed rhythm    Interpreted by me                         Medical Decision Making:  MDM  Number of Diagnoses or Management Options  Atrial fibrillation, unspecified type (CMS/HCC)  Hypoglycemia  Diagnosis management comments: The patient´s CBC was reviewed and shows no abnormalities of critical concern. Of note, there is no anemia requiring a blood transfusion and the platelet count is acceptable.   The patient´s CMP was reviewed and shows no abnormalities of critical concern. Of note, the patient´s sodium and potassium are acceptable. The patient´s liver enzymes are unremarkable. The patient´s renal function (creatinine) is preserved. The patient has a normal anion gap.  Patient was given a meal in the emergency department and she is currently resting comfortably with no altered mental status.  The patient is resting comfortably and feels better, is alert, talkative and in no distress. The repeat examination is unremarkable and benign. The patient is neurologically intact, has a normal mental status and this ambulatory in the ED. The history, exam, diagnostic testing in the patient's current condition do not suggest meningitis, stroke, sepsis, subarachnoid hemorrhage, intracranial bleeding, encephalitis or other significant pathology that would warrant further testing, continued ED treatment, admission, neurological consultation, or other specialist evaluation at this point.  Patient was found to be in atrial fibrillation without rapid ventricular response.  Case was discussed with Dr. Rodriguez who does recommend starting the patient on Xarelto and having her follow-up in his office in the next 2 to 3 days.  Patient's daughter is at the bedside.  She will help ensure follow-up.  The patient does not live alone and does live with a significant other.  She is stable and suitable for discharge.  The vital signs have been stable. The patient's condition is stable and appropriate for discharge. The patient will pursue further outpatient evaluation with the primary care physician or other designated or consulting position as indicated in the discharge instructions.       Amount and/or Complexity of Data Reviewed  Clinical lab tests: reviewed  Tests in the medicine section of CPT®: reviewed  Discussion of test results with the performing providers: yes  Discuss the patient with other providers: yes  Independent  visualization of images, tracings, or specimens: yes    Risk of Complications, Morbidity, and/or Mortality  Presenting problems: moderate  Management options: moderate    Patient Progress  Patient progress: improved       Final diagnoses:   Hypoglycemia   Atrial fibrillation, unspecified type (CMS/[facility])        Disposition:  ED Disposition     ED Disposition Condition Comment    Discharge Stable                    Martine Walton MD  08/09/21 9178

## 2021-08-12 ENCOUNTER — TELEPHONE (OUTPATIENT)
Dept: FAMILY MEDICINE CLINIC | Facility: CLINIC | Age: 66
End: 2021-08-12

## 2021-08-12 NOTE — TELEPHONE ENCOUNTER
Pt called asking for a different medication to take other than lisinopril (PRINIVIL,ZESTRIL) 10 MG tablet. Pt states this medication gives her a cough. Pt has appt on 8/18

## 2021-08-19 DIAGNOSIS — E11.65 TYPE 2 DIABETES MELLITUS WITH HYPERGLYCEMIA, WITH LONG-TERM CURRENT USE OF INSULIN: Primary | ICD-10-CM

## 2021-08-19 DIAGNOSIS — Z79.4 TYPE 2 DIABETES MELLITUS WITH HYPERGLYCEMIA, WITH LONG-TERM CURRENT USE OF INSULIN: Primary | ICD-10-CM

## 2021-08-19 RX ORDER — BLOOD-GLUCOSE METER
1 KIT MISCELLANEOUS DAILY
COMMUNITY
End: 2022-01-11 | Stop reason: SDUPTHER

## 2021-08-19 RX ORDER — BLOOD-GLUCOSE METER
1 KIT MISCELLANEOUS DAILY
Qty: 1 EACH | Refills: 0 | Status: CANCELLED | OUTPATIENT
Start: 2021-08-19

## 2021-08-25 ENCOUNTER — OFFICE VISIT (OUTPATIENT)
Dept: FAMILY MEDICINE CLINIC | Facility: CLINIC | Age: 66
End: 2021-08-25

## 2021-08-25 VITALS
TEMPERATURE: 97.7 F | SYSTOLIC BLOOD PRESSURE: 111 MMHG | OXYGEN SATURATION: 98 % | WEIGHT: 267 LBS | DIASTOLIC BLOOD PRESSURE: 58 MMHG | BODY MASS INDEX: 49.13 KG/M2 | HEIGHT: 62 IN | HEART RATE: 76 BPM

## 2021-08-25 DIAGNOSIS — E66.01 MORBID (SEVERE) OBESITY DUE TO EXCESS CALORIES (HCC): Primary | ICD-10-CM

## 2021-08-25 DIAGNOSIS — I10 ESSENTIAL HYPERTENSION: ICD-10-CM

## 2021-08-25 PROCEDURE — 99213 OFFICE O/P EST LOW 20 MIN: CPT | Performed by: NURSE PRACTITIONER

## 2021-08-25 RX ORDER — LOSARTAN POTASSIUM 25 MG/1
25 TABLET ORAL DAILY
Qty: 90 TABLET | Refills: 1 | Status: SHIPPED | OUTPATIENT
Start: 2021-08-25 | End: 2022-02-22 | Stop reason: SDUPTHER

## 2021-08-25 RX ORDER — ASPIRIN 81 MG/1
81 TABLET, CHEWABLE ORAL DAILY
COMMUNITY

## 2021-08-25 NOTE — PROGRESS NOTES
Chief Complaint  Obesity (Weight loss management appointment #4)     Diabetes, HTN  Subjective          Anita Lemus presents to [facility]  History of Present Illness     Weight loss management appointment #4.  Patient has lost 7 lbs since last office visit.  Patient is using treadmill at [employer].  She is scheduled for possible gastric sleeve next month on September 29.  She states she is really controlled her diet as well and she is feeling very good overall.  She states she recently had stress test and was cleared by cardiology.    Patient requesting alternate to Lisinopril.  She has developed a dry cough.  It works good for her to control her blood pressure.  She is requesting different medication.    Patient needs Rx for new glucose meter, test strips, lancets.        Past Medical History:   Diagnosis Date   • Anemia    • Arthritis    • Hypertension    • Obesity    • Type 1 diabetes (CMS/HCC)    • Type 2 diabetes mellitus (CMS/HCC)    • Vitamin D deficiency          Allergies   Allergen Reactions   • Aspirin Nausea Only   • Cyclobenzaprine Irritability   • Ketorolac Tromethamine Hives          Past Surgical History:   Procedure Laterality Date   • HYSTERECTOMY      total   • KNEE ARTHROSCOPY Left     diagnostic   • THYROID CYST EXCISION      aspiration   • THYROIDECTOMY, PARTIAL            Social History     Tobacco Use   • Smoking status: Never Smoker   • Smokeless tobacco: Never Used   Substance Use Topics   • Alcohol use: Never         Family History   Problem Relation Age of Onset   • Heart disease Mother    • Diabetes Mother    • Heart disease Father    • Emphysema Father    • Throat cancer Paternal Grandfather           Current Outpatient Medications on File Prior to Visit   Medication Sig   • aspirin 81 MG chewable tablet Chew 81 mg Daily.   • Blood Glucose Monitoring Suppl (Assure Pro Blood Glucose Meter) device 1 each Daily. Use as directed to test blood sugar  daily. Dx: E11.9   • ferrous sulfate 325 (65 FE) MG tablet Take 325 mg by mouth Daily With Breakfast.   • furosemide (LASIX) 20 MG tablet Take 1 tablet by mouth 3 (Three) Times a Day.   • glucose blood test strip 100 each by Other route As Needed. Use as instructed to test blood sugar daily.  Dx: E11.9   • HumuLIN N KwikPen 100 UNIT/ML injection Inject 35 Units under the skin into the appropriate area as directed 2 (two) times a day.   • insulin regular (humuLIN R,novoLIN R) 100 UNIT/ML injection Inject 5 Units under the skin into the appropriate area as directed 3 (Three) Times a Day Before Meals.   • metFORMIN (GLUCOPHAGE) 500 MG tablet Take 2 tablets by mouth 2 (two) times a day.   • nitrofurantoin, macrocrystal-monohydrate, (Macrobid) 100 MG capsule Take 1 capsule by mouth 2 (Two) Times a Day.   • nystatin (MYCOSTATIN) 100136 UNIT/GM cream APPLY TO THE AFFECTED AREA TWICE A DAY   • phenazopyridine (Pyridium) 200 MG tablet Take 1 tablet by mouth 3 (Three) Times a Day As Needed for Bladder Spasms.   • polyethylene glycol-electrolytes (Nulytely Lemon-Lime) 420 g solution NuLYTELY with Flavor Packs 420 gram oral recon soln take as directed 4/7/2021  Active   • Rivaroxaban (Xarelto Starter Pack) tablet therapy pack starter pack Take one 15 mg tablet twice daily with food for 21 days.  Followed by one 20 mg tablet by mouth once daily with food. Take as directed   • vitamin D (ERGOCALCIFEROL) 1.25 MG (26730 UT) capsule capsule Take 50,000 Units by mouth 1 (One) Time Per Week.     No current facility-administered medications on file prior to visit.         Immunization History   Administered Date(s) Administered   • COVID-19 (MODERNA) 05/10/2021, 06/04/2021   • Flu Vaccine Split Quad 02/21/2017, 02/21/2017   • Influenza TIV (IM) 10/15/2020, 02/24/2021   • Influenza, Unspecified 02/24/2021         /58 (BP Location: Left arm, Patient Position: Sitting)   Pulse 76   Temp 97.7 °F (36.5 °C) (Oral)   Ht 157.5 cm  "(62\")   Wt 121 kg (267 lb)   SpO2 98%   BMI 48.83 kg/m²             Physical Exam  Vitals reviewed.   Constitutional:       Appearance: Normal appearance. She is well-developed.   HENT:      Head: Normocephalic and atraumatic.      Right Ear: External ear normal.      Left Ear: External ear normal.      Mouth/Throat:      Pharynx: No oropharyngeal exudate.   Eyes:      Conjunctiva/sclera: Conjunctivae normal.      Pupils: Pupils are equal, round, and reactive to light.   Cardiovascular:      Rate and Rhythm: Normal rate and regular rhythm.      Heart sounds: No murmur heard.   No friction rub. No gallop.    Pulmonary:      Effort: Pulmonary effort is normal.      Breath sounds: Normal breath sounds. No wheezing or rhonchi.   Skin:     General: Skin is warm and dry.   Neurological:      Mental Status: She is alert and oriented to person, place, and time.      Cranial Nerves: No cranial nerve deficit.   Psychiatric:         Mood and Affect: Mood and affect normal.         Behavior: Behavior normal.         Thought Content: Thought content normal.         Judgment: Judgment normal.             Result Review :                           Assessment and Plan      Diagnoses and all orders for this visit:    1. Morbid (severe) obesity due to excess calories (CMS/HCC) (Primary)  Comments:  Paperwork completed for final visit and fax to bariatric surgeon.    2. Essential hypertension  Comments:  DC lisinopril, changed to Cozaar 25 mg daily side effects administration addressed patient advised to monitor BP  Orders:  -     losartan (Cozaar) 25 MG tablet; Take 1 tablet by mouth Daily.  Dispense: 90 tablet; Refill: 1              Follow Up     Return in about 2 months (around 10/25/2021).    Patient was given instructions and counseling regarding her condition or for health maintenance advice. Please see specific information pulled into the AVS if appropriate.         "

## 2021-08-26 ENCOUNTER — TELEPHONE (OUTPATIENT)
Dept: FAMILY MEDICINE CLINIC | Facility: CLINIC | Age: 66
End: 2021-08-26

## 2021-08-26 DIAGNOSIS — R19.5 POSITIVE COLORECTAL CANCER SCREENING USING COLOGUARD TEST: Primary | ICD-10-CM

## 2021-08-26 NOTE — TELEPHONE ENCOUNTER
----- Message from PARADISE Bateman sent at 8/23/2021 10:33 AM EDT -----  Patient with positive Cologuard, ensure referral for colonoscopy.

## 2021-09-02 ENCOUNTER — APPOINTMENT (OUTPATIENT)
Dept: CARDIOLOGY | Facility: HOSPITAL | Age: 66
End: 2021-09-02

## 2021-09-13 ENCOUNTER — TELEPHONE (OUTPATIENT)
Dept: UROLOGY | Facility: CLINIC | Age: 66
End: 2021-09-13

## 2021-09-13 NOTE — TELEPHONE ENCOUNTER
Tried multiple times reaching patient at her phone number, but can never get call to connect.     I LMOM for daughter Scarlett to please have her mother call us back to reschedule her appointment with .      will NOT be able to see patients on Fridays for several weeks due to changes in OR schedule/COVID restrictions.  can see her on Thursday 9/16 sometime before 1:30 please.

## 2021-09-15 NOTE — TELEPHONE ENCOUNTER
Tried calling patient again and still was not able to connect to her.     I was able to reach her daughter Scarlett and she states that her mom is currently without a phone (?something about the sim card has not arrived yet).  She will get in touch with her mother via her mom's male  and have them to call the office to reschedule. She will make sure to notify them that  will NOT be in the office on 9/17.

## 2021-10-04 ENCOUNTER — TELEPHONE (OUTPATIENT)
Dept: FAMILY MEDICINE CLINIC | Facility: CLINIC | Age: 66
End: 2021-10-04

## 2021-10-04 NOTE — TELEPHONE ENCOUNTER
Caller: Anita Lemus    Relationship: Self    Best call back [phone]    What is the best time to reach you: ANYTIME    Who are you requesting to speak with (clinical staff, provider,  specific staff member): PARADISE CARRINGTON     What was the call regarding: PATIENT WANTED YOU TO KNOW THAT HER SURGERY HAS BEEN MOVED FROM September 25TH TO October 25TH DUE TO COVID.     Do you require a callback: NO

## 2021-10-22 ENCOUNTER — TELEPHONE (OUTPATIENT)
Dept: FAMILY MEDICINE CLINIC | Facility: CLINIC | Age: 66
End: 2021-10-22

## 2021-10-22 NOTE — TELEPHONE ENCOUNTER
Caller: Anita Lemus    Relationship: Self      Requested Prescriptions:   PIN NEEDLES      Pharmacy where request should be sent: [id number] [place] [facility] [address] - [phone] [facility] [phone]   [phone]    Additional details provided by patient: PATIENT STATED SHE IS NEEDING THIS ASAP PLEASE ADVISE THANK YOU.     Best call back number: [phone]     Does the patient have less than a 3 day supply:  [x] Yes  [] No    Delfin Izquierdo Rep   10/22/21 15:02 EDT

## 2021-11-17 ENCOUNTER — OFFICE VISIT (OUTPATIENT)
Dept: FAMILY MEDICINE CLINIC | Facility: CLINIC | Age: 66
End: 2021-11-17

## 2021-11-17 VITALS
DIASTOLIC BLOOD PRESSURE: 50 MMHG | OXYGEN SATURATION: 98 % | BODY MASS INDEX: 43.43 KG/M2 | HEART RATE: 107 BPM | TEMPERATURE: 97.6 F | WEIGHT: 236 LBS | SYSTOLIC BLOOD PRESSURE: 120 MMHG | HEIGHT: 62 IN

## 2021-11-17 DIAGNOSIS — Z98.84 STATUS POST BARIATRIC SURGERY: ICD-10-CM

## 2021-11-17 DIAGNOSIS — I10 BENIGN HYPERTENSION: ICD-10-CM

## 2021-11-17 DIAGNOSIS — Z09 SURGERY FOLLOW-UP: Primary | ICD-10-CM

## 2021-11-17 DIAGNOSIS — E11.43 TYPE 2 DIABETES MELLITUS WITH DIABETIC AUTONOMIC NEUROPATHY, WITH LONG-TERM CURRENT USE OF INSULIN (HCC): ICD-10-CM

## 2021-11-17 DIAGNOSIS — Z79.4 TYPE 2 DIABETES MELLITUS WITH DIABETIC AUTONOMIC NEUROPATHY, WITH LONG-TERM CURRENT USE OF INSULIN (HCC): ICD-10-CM

## 2021-11-17 DIAGNOSIS — E55.9 VITAMIN D DEFICIENCY: ICD-10-CM

## 2021-11-17 PROCEDURE — 99214 OFFICE O/P EST MOD 30 MIN: CPT | Performed by: NURSE PRACTITIONER

## 2021-11-17 RX ORDER — ERGOCALCIFEROL 1.25 MG/1
50000 CAPSULE ORAL WEEKLY
Qty: 5 CAPSULE | Refills: 5 | Status: SHIPPED | OUTPATIENT
Start: 2021-11-17 | End: 2022-08-23

## 2021-11-17 NOTE — PROGRESS NOTES
Chief Complaint  Follow-up (Bariatric surgery) and Obesity    Subjective          Anita Lemus presents to [facility]  History of Present Illness    Patient here to follow up after Bariatric Sleeve surgery per Dr. Hamlin at [facility].  Patient has lost 24lbs since her surgery on 10/25/21.  Patient states she is doing very well and her incisions are healing well.  Dr. Hamlin has taken patient off of most of her medications including those for Diabetes, patient is monitoring her blood sugars closely.     DM- Patient is requesting refill on Humulin. R. Pt is using the humulin R on a sliding scale prn after meals.     Past Medical History:   Diagnosis Date   • Anemia    • Arthritis    • Hypertension    • Obesity    • Type 1 diabetes (HCC)    • Type 2 diabetes mellitus (HCC)    • Vitamin D deficiency          Allergies   Allergen Reactions   • Aspirin Nausea Only   • Cyclobenzaprine Irritability   • Ketorolac Tromethamine Hives          Past Surgical History:   Procedure Laterality Date   • BARIATRIC SURGERY      Sleeve   • HYSTERECTOMY      total   • KNEE ARTHROSCOPY Left     diagnostic   • THYROID CYST EXCISION      aspiration   • THYROIDECTOMY, PARTIAL            Social History     Tobacco Use   • Smoking status: Never Smoker   • Smokeless tobacco: Never Used   Substance Use Topics   • Alcohol use: Never         Family History   Problem Relation Age of Onset   • Heart disease Mother    • Diabetes Mother    • Heart disease Father    • Emphysema Father    • Throat cancer Paternal Grandfather           Current Outpatient Medications on File Prior to Visit   Medication Sig   • aspirin 81 MG chewable tablet Chew 81 mg Daily.   • Blood Glucose Monitoring Suppl (Assure Pro Blood Glucose Meter) device 1 each Daily. Use as directed to test blood sugar daily. Dx: E11.9   • ferrous sulfate 325 (65 FE) MG tablet Take 325 mg by mouth Daily With Breakfast.   • glucose blood test strip  100 each by Other route As Needed. Use as instructed to test blood sugar daily.  Dx: E11.9   • losartan (Cozaar) 25 MG tablet Take 1 tablet by mouth Daily.   • nystatin (MYCOSTATIN) 967279 UNIT/GM cream APPLY TO THE AFFECTED AREA TWICE A DAY   • [DISCONTINUED] insulin regular (humuLIN R,novoLIN R) 100 UNIT/ML injection Inject 5 Units under the skin into the appropriate area as directed 3 (Three) Times a Day Before Meals.   • [DISCONTINUED] vitamin D (ERGOCALCIFEROL) 1.25 MG (19187 UT) capsule capsule Take 50,000 Units by mouth 1 (One) Time Per Week.   • [DISCONTINUED] furosemide (LASIX) 20 MG tablet Take 1 tablet by mouth 3 (Three) Times a Day.   • [DISCONTINUED] HumuLIN N KwikPen 100 UNIT/ML injection Inject 35 Units under the skin into the appropriate area as directed 2 (two) times a day.   • [DISCONTINUED] metFORMIN (GLUCOPHAGE) 500 MG tablet Take 2 tablets by mouth 2 (two) times a day.   • [DISCONTINUED] nitrofurantoin, macrocrystal-monohydrate, (Macrobid) 100 MG capsule Take 1 capsule by mouth 2 (Two) Times a Day.   • [DISCONTINUED] phenazopyridine (Pyridium) 200 MG tablet Take 1 tablet by mouth 3 (Three) Times a Day As Needed for Bladder Spasms.   • [DISCONTINUED] polyethylene glycol-electrolytes (Nulytely Lemon-Lime) 420 g solution NuLYTELY with Flavor Packs 420 gram oral recon soln take as directed 4/7/2021  Active   • [DISCONTINUED] Rivaroxaban (Xarelto Starter Pack) tablet therapy pack starter pack Take one 15 mg tablet twice daily with food for 21 days.  Followed by one 20 mg tablet by mouth once daily with food. Take as directed     No current facility-administered medications on file prior to visit.         Immunization History   Administered Date(s) Administered   • COVID-19 (MODERNA) 1st, 2nd, 3rd Dose Only 05/10/2021, 06/04/2021   • Flu Vaccine Intradermal Quad 18-64YR 10/27/2021   • Flu Vaccine Split Quad 02/21/2017, 02/21/2017   • Influenza TIV (IM) 10/15/2020, 02/24/2021   • Influenza, Unspecified  "02/24/2021         /50 (BP Location: Left arm, Patient Position: Sitting)   Pulse 107   Temp 97.6 °F (36.4 °C) (Oral)   Ht 157.5 cm (62\")   Wt 107 kg (236 lb)   SpO2 98%   BMI 43.16 kg/m²             Physical Exam  Vitals reviewed.   Constitutional:       Appearance: Normal appearance. She is well-developed.   HENT:      Head: Normocephalic and atraumatic.      Right Ear: External ear normal.      Left Ear: External ear normal.      Mouth/Throat:      Pharynx: No oropharyngeal exudate.   Eyes:      Conjunctiva/sclera: Conjunctivae normal.      Pupils: Pupils are equal, round, and reactive to light.   Cardiovascular:      Rate and Rhythm: Normal rate and regular rhythm.      Heart sounds: No murmur heard.  No friction rub. No gallop.    Pulmonary:      Effort: Pulmonary effort is normal.      Breath sounds: Normal breath sounds. No wheezing or rhonchi.   Skin:     General: Skin is warm and dry.   Neurological:      Mental Status: She is alert and oriented to person, place, and time.      Cranial Nerves: No cranial nerve deficit.   Psychiatric:         Mood and Affect: Mood and affect normal.         Behavior: Behavior normal.         Thought Content: Thought content normal.         Judgment: Judgment normal.             Result Review :                           Assessment and Plan      Diagnoses and all orders for this visit:    1. Surgery follow-up (Primary)    2. Status post bariatric surgery  Comments:  Continue diet and exercise regimen    3. Type 2 diabetes mellitus with diabetic autonomic neuropathy, with long-term current use of insulin ([facility])  Comments:  Continue close blood sugar monitoring will refill Humulin R  Orders:  -     insulin regular (humuLIN R,novoLIN R) 100 UNIT/ML injection; Inject 5 Units under the skin into the appropriate area as directed 3 (Three) Times a Day Before Meals.  Dispense: 1 each; Refill: 1  -     glucose blood test strip; Use as instructed  Dispense: 100 each; Refill: " 5  -     Insulin Syringes, Disposable, U-100 1 ML misc; 1 each 3 (Three) Times a Day.  Dispense: 100 each; Refill: 3    4. Benign hypertension  Comments:  BP well controlled, continue Cozaar    5. Vitamin D deficiency  Comments:  Continue weekly vitamin D supplement  Orders:  -     vitamin D (ERGOCALCIFEROL) 1.25 MG (65173 UT) capsule capsule; Take 1 capsule by mouth 1 (One) Time Per Week.  Dispense: 5 capsule; Refill: 5      Will check lab work at next office visit.        Follow Up     Return in about 3 months (around 2/17/2022).    Patient was given instructions and counseling regarding her condition or for health maintenance advice. Please see specific information pulled into the AVS if appropriate.

## 2021-12-01 NOTE — TELEPHONE ENCOUNTER
Caller: Anita Lemus    Relationship: Self    Best call back number: [phone]    Requested Prescriptions:   Requested Prescriptions     Pending Prescriptions Disp Refills   • glucose blood test strip       Si each by Other route As Needed. Use as instructed to test blood sugar daily.  Dx: E11.9        Pharmacy where request should be sent: [facility] [address] [place] [address] [phone] [facility] [phone] FX     Additional details provided by patient: PATIENT STATES ACCUCHECK GUIDE STRIPS    Does the patient have less than a 3 day supply:  [x] Yes  [] No

## 2022-01-10 ENCOUNTER — HOSPITAL ENCOUNTER (EMERGENCY)
Facility: HOSPITAL | Age: 67
Discharge: HOME | End: 2022-01-11
Attending: EMERGENCY MEDICINE | Admitting: EMERGENCY MEDICINE

## 2022-01-10 ENCOUNTER — APPOINTMENT (OUTPATIENT)
Dept: GENERAL RADIOLOGY | Facility: HOSPITAL | Age: 67
End: 2022-01-10

## 2022-01-10 DIAGNOSIS — E11.65 UNCONTROLLED TYPE 2 DIABETES MELLITUS WITH HYPERGLYCEMIA: ICD-10-CM

## 2022-01-10 DIAGNOSIS — B34.9 VIRAL SYNDROME: Primary | ICD-10-CM

## 2022-01-10 DIAGNOSIS — J18.9 MULTIFOCAL PNEUMONIA: ICD-10-CM

## 2022-01-10 DIAGNOSIS — I48.11 LONGSTANDING PERSISTENT ATRIAL FIBRILLATION: ICD-10-CM

## 2022-01-10 DIAGNOSIS — I50.9 CONGESTIVE HEART FAILURE, UNSPECIFIED HF CHRONICITY, UNSPECIFIED HEART FAILURE TYPE: ICD-10-CM

## 2022-01-10 DIAGNOSIS — J81.1 CHRONIC PULMONARY EDEMA: ICD-10-CM

## 2022-01-10 LAB
ALBUMIN SERPL-MCNC: 3.7 G/DL (ref 3.5–5.2)
ALBUMIN/GLOB SERPL: 1.4 G/DL
ALP SERPL-CCNC: 251 U/L (ref 39–117)
ALT SERPL W P-5'-P-CCNC: 13 U/L (ref 1–33)
ANION GAP SERPL CALCULATED.3IONS-SCNC: 10.9 MMOL/L (ref 5–15)
AST SERPL-CCNC: 22 U/L (ref 1–32)
BASOPHILS # BLD AUTO: 0.05 10*3/MM3 (ref 0–0.2)
BASOPHILS NFR BLD AUTO: 0.7 % (ref 0–1.5)
BILIRUB SERPL-MCNC: 1.6 MG/DL (ref 0–1.2)
BUN SERPL-MCNC: 15 MG/DL (ref 8–23)
BUN/CREAT SERPL: 18.5 (ref 7–25)
CALCIUM SPEC-SCNC: 8.9 MG/DL (ref 8.6–10.5)
CHLORIDE SERPL-SCNC: 101 MMOL/L (ref 98–107)
CO2 SERPL-SCNC: 24.1 MMOL/L (ref 22–29)
CREAT SERPL-MCNC: 0.81 MG/DL (ref 0.57–1)
DEPRECATED RDW RBC AUTO: 51.7 FL (ref 37–54)
EOSINOPHIL # BLD AUTO: 0.02 10*3/MM3 (ref 0–0.4)
EOSINOPHIL NFR BLD AUTO: 0.3 % (ref 0.3–6.2)
ERYTHROCYTE [DISTWIDTH] IN BLOOD BY AUTOMATED COUNT: 15.9 % (ref 12.3–15.4)
FLUAV AG NPH QL: NEGATIVE
FLUBV AG NPH QL IA: NEGATIVE
GFR SERPL CREATININE-BSD FRML MDRD: 71 ML/MIN/1.73
GLOBULIN UR ELPH-MCNC: 2.7 GM/DL
GLUCOSE SERPL-MCNC: 226 MG/DL (ref 65–99)
HCT VFR BLD AUTO: 38.5 % (ref 34–46.6)
HGB BLD-MCNC: 12.8 G/DL (ref 12–15.9)
HOLD SPECIMEN: NORMAL
HOLD SPECIMEN: NORMAL
IMM GRANULOCYTES # BLD AUTO: 0.03 10*3/MM3 (ref 0–0.05)
IMM GRANULOCYTES NFR BLD AUTO: 0.4 % (ref 0–0.5)
LYMPHOCYTES # BLD AUTO: 0.69 10*3/MM3 (ref 0.7–3.1)
LYMPHOCYTES NFR BLD AUTO: 9.8 % (ref 19.6–45.3)
MCH RBC QN AUTO: 29.4 PG (ref 26.6–33)
MCHC RBC AUTO-ENTMCNC: 33.2 G/DL (ref 31.5–35.7)
MCV RBC AUTO: 88.5 FL (ref 79–97)
MONOCYTES # BLD AUTO: 0.68 10*3/MM3 (ref 0.1–0.9)
MONOCYTES NFR BLD AUTO: 9.7 % (ref 5–12)
NEUTROPHILS NFR BLD AUTO: 5.54 10*3/MM3 (ref 1.7–7)
NEUTROPHILS NFR BLD AUTO: 79.1 % (ref 42.7–76)
NRBC BLD AUTO-RTO: 0 /100 WBC (ref 0–0.2)
NT-PROBNP SERPL-MCNC: 3095 PG/ML (ref 0–900)
PLATELET # BLD AUTO: 162 10*3/MM3 (ref 140–450)
PMV BLD AUTO: 10.4 FL (ref 6–12)
POTASSIUM SERPL-SCNC: 3.4 MMOL/L (ref 3.5–5.2)
PROT SERPL-MCNC: 6.4 G/DL (ref 6–8.5)
RBC # BLD AUTO: 4.35 10*6/MM3 (ref 3.77–5.28)
SODIUM SERPL-SCNC: 136 MMOL/L (ref 136–145)
TROPONIN T SERPL-MCNC: <0.01 NG/ML (ref 0–0.03)
WBC NRBC COR # BLD: 7.01 10*3/MM3 (ref 3.4–10.8)
WHOLE BLOOD HOLD SPECIMEN: NORMAL
WHOLE BLOOD HOLD SPECIMEN: NORMAL

## 2022-01-10 PROCEDURE — 36415 COLL VENOUS BLD VENIPUNCTURE: CPT

## 2022-01-10 PROCEDURE — 85025 COMPLETE CBC W/AUTO DIFF WBC: CPT

## 2022-01-10 PROCEDURE — 93005 ELECTROCARDIOGRAM TRACING: CPT

## 2022-01-10 PROCEDURE — 99284 EMERGENCY DEPT VISIT MOD MDM: CPT

## 2022-01-10 PROCEDURE — 93010 ELECTROCARDIOGRAM REPORT: CPT | Performed by: INTERNAL MEDICINE

## 2022-01-10 PROCEDURE — U0004 COV-19 TEST NON-CDC HGH THRU: HCPCS

## 2022-01-10 PROCEDURE — 93005 ELECTROCARDIOGRAM TRACING: CPT | Performed by: EMERGENCY MEDICINE

## 2022-01-10 PROCEDURE — 83880 ASSAY OF NATRIURETIC PEPTIDE: CPT

## 2022-01-10 PROCEDURE — 87804 INFLUENZA ASSAY W/OPTIC: CPT

## 2022-01-10 PROCEDURE — 71045 X-RAY EXAM CHEST 1 VIEW: CPT

## 2022-01-10 PROCEDURE — 80053 COMPREHEN METABOLIC PANEL: CPT

## 2022-01-10 PROCEDURE — 84484 ASSAY OF TROPONIN QUANT: CPT

## 2022-01-10 RX ORDER — SODIUM CHLORIDE 0.9 % (FLUSH) 0.9 %
10 SYRINGE (ML) INJECTION AS NEEDED
Status: DISCONTINUED | OUTPATIENT
Start: 2022-01-10 | End: 2022-01-11 | Stop reason: HOSPADM

## 2022-01-11 ENCOUNTER — TELEPHONE (OUTPATIENT)
Dept: FAMILY MEDICINE CLINIC | Facility: CLINIC | Age: 67
End: 2022-01-11

## 2022-01-11 VITALS
SYSTOLIC BLOOD PRESSURE: 123 MMHG | DIASTOLIC BLOOD PRESSURE: 88 MMHG | HEART RATE: 105 BPM | HEIGHT: 63 IN | OXYGEN SATURATION: 95 % | TEMPERATURE: 99.6 F | WEIGHT: 234.35 LBS | BODY MASS INDEX: 41.52 KG/M2 | RESPIRATION RATE: 28 BRPM

## 2022-01-11 DIAGNOSIS — E11.43 TYPE 2 DIABETES MELLITUS WITH DIABETIC AUTONOMIC NEUROPATHY, WITH LONG-TERM CURRENT USE OF INSULIN: ICD-10-CM

## 2022-01-11 DIAGNOSIS — Z79.4 TYPE 2 DIABETES MELLITUS WITH DIABETIC AUTONOMIC NEUROPATHY, WITH LONG-TERM CURRENT USE OF INSULIN: ICD-10-CM

## 2022-01-11 LAB — SARS-COV-2 RNA PNL SPEC NAA+PROBE: DETECTED

## 2022-01-11 PROCEDURE — 94799 UNLISTED PULMONARY SVC/PX: CPT

## 2022-01-11 PROCEDURE — 25010000002 FUROSEMIDE PER 20 MG: Performed by: EMERGENCY MEDICINE

## 2022-01-11 PROCEDURE — 94640 AIRWAY INHALATION TREATMENT: CPT

## 2022-01-11 PROCEDURE — 96374 THER/PROPH/DIAG INJ IV PUSH: CPT

## 2022-01-11 RX ORDER — IPRATROPIUM BROMIDE AND ALBUTEROL SULFATE 2.5; .5 MG/3ML; MG/3ML
3 SOLUTION RESPIRATORY (INHALATION)
Status: COMPLETED | OUTPATIENT
Start: 2022-01-11 | End: 2022-01-11

## 2022-01-11 RX ORDER — ALBUTEROL SULFATE 90 UG/1
2 AEROSOL, METERED RESPIRATORY (INHALATION) EVERY 4 HOURS PRN
Qty: 8 G | Refills: 0 | Status: SHIPPED | OUTPATIENT
Start: 2022-01-11 | End: 2022-08-23 | Stop reason: SDUPTHER

## 2022-01-11 RX ORDER — FUROSEMIDE 20 MG/1
20 TABLET ORAL DAILY
Qty: 3 TABLET | Refills: 0 | Status: SHIPPED | OUTPATIENT
Start: 2022-01-11 | End: 2022-08-23 | Stop reason: SDDI

## 2022-01-11 RX ORDER — FUROSEMIDE 10 MG/ML
60 INJECTION INTRAMUSCULAR; INTRAVENOUS ONCE
Status: COMPLETED | OUTPATIENT
Start: 2022-01-11 | End: 2022-01-11

## 2022-01-11 RX ORDER — AMOXICILLIN AND CLAVULANATE POTASSIUM 875; 125 MG/1; MG/1
1 TABLET, FILM COATED ORAL 2 TIMES DAILY
Qty: 20 TABLET | Refills: 0 | Status: SHIPPED | OUTPATIENT
Start: 2022-01-11 | End: 2022-01-21

## 2022-01-11 RX ORDER — BENZONATATE 100 MG/1
200 CAPSULE ORAL 3 TIMES DAILY PRN
Qty: 30 CAPSULE | Refills: 0 | Status: SHIPPED | OUTPATIENT
Start: 2022-01-11 | End: 2022-01-21

## 2022-01-11 RX ORDER — AMOXICILLIN AND CLAVULANATE POTASSIUM 875; 125 MG/1; MG/1
1 TABLET, FILM COATED ORAL ONCE
Status: COMPLETED | OUTPATIENT
Start: 2022-01-11 | End: 2022-01-11

## 2022-01-11 RX ADMIN — IPRATROPIUM BROMIDE AND ALBUTEROL SULFATE 3 ML: 2.5; .5 SOLUTION RESPIRATORY (INHALATION) at 02:25

## 2022-01-11 RX ADMIN — AMOXICILLIN AND CLAVULANATE POTASSIUM 1 TABLET: 875; 125 TABLET, FILM COATED ORAL at 05:21

## 2022-01-11 RX ADMIN — IPRATROPIUM BROMIDE AND ALBUTEROL SULFATE 3 ML: 2.5; .5 SOLUTION RESPIRATORY (INHALATION) at 02:10

## 2022-01-11 RX ADMIN — IPRATROPIUM BROMIDE AND ALBUTEROL SULFATE 3 ML: 2.5; .5 SOLUTION RESPIRATORY (INHALATION) at 02:17

## 2022-01-11 RX ADMIN — FUROSEMIDE 60 MG: 10 INJECTION, SOLUTION INTRAMUSCULAR; INTRAVENOUS at 01:46

## 2022-01-11 NOTE — DISCHARGE INSTRUCTIONS
Your were tested for COVID-19 today.  Please stay quarantined at home until  you can review your COVID-19 results with your primary care physician and are released from quarantine    Please follow-up with your doctor tomorrow and review your chest x-ray as well as your BNP.  Please discuss the need for cardiology referral and echocardiogram    Return to the emergency room immediately if you change your mind and would like admission to the hospital or if you are shortness of breath worsens or if you develop chest pain, chest pressure, near passing out, passing out, uncontrolled fever, extreme fatigue, intractable vomiting or any new symptoms you are concerned with    Please also discuss the the benefits of steroid/prednisone with your primary care physician any possible sliding scale insulin in order to control your hyperglycemia

## 2022-01-11 NOTE — TELEPHONE ENCOUNTER
Caller: SYED HAYS    Relationship: Emergency Contact    Best call back number: [phone]    Caller requesting test results: YES    What test was performed: COVID    When was the test performed:LAST NIGHT    Where was the test performed: HOSPITAL    Additional notes:

## 2022-01-11 NOTE — ED NOTES
WALKED FOR A MINUTE, OXYGEN DROPPED TO 84% THIRTY SECONDS IN,      Ana Rosa Singh, PCT  01/11/22 0410

## 2022-01-11 NOTE — ED NOTES
WALKED PT FOR ONE MINUTE, THIRTY SECONDS IN HER O2 DROPPED TO 84%. OXYGEN RETURNED TO 92% AFTER THIRTY MORE SECONDS.     Ana Rosa Singh, PCT  01/11/22 0424

## 2022-01-11 NOTE — ED PROVIDER NOTES
Time: 11:54 PM EST  Arrived by: Private car  Chief Complaint: Illness  History provided by: Patient  History is limited by: N/A     History of Present Illness:    Anita Lemus is a 66 y.o. female who presents to the emergency department today with complaints of moderate and constant illness. The patient reports that she went to the gym on Thursday, though she denies known exposure to individuals who were ill at this location. For the past couple of days, she states that she has been ill with subjective fever, headache, dry cough, and shortness of breath. She denies any chills, chest pain, abdominal pain, leg swelling, or sore throat with her illness. She adds that her difficulty breathing has been worsening with time. It also seems to worsen when she lays flat or with mild exertion.    The patient notes that she is on Humalog R for her diabetes. She denies having a sliding scale insulin available. She also has a medical history of anemia, arthritis, and hypertension. The patient denies seeing a regular cardiologist. She denies ever smoking cigarettes. The patient is vaccinated against COVID-19. There are no other acute complaints at this time.      History provided by:  Patient   used: No    Illness  Location:  N/A  Quality:  Fever, cough, shortness of breath, headache, shortness of breath  Severity:  Moderate  Onset quality:  Gradual  Duration:  2 days  Timing:  Constant  Progression:  Worsening  Chronicity:  New  Context:  The patient reports that for the past two days, she has been ill with fever, cough, and shortness of breath.   Relieved by:  Shortness of breath improves with rest.  Worsened by:  Shortness of breath worsens with lying flat or mild exertion.  Ineffective treatments:  N/A  Associated symptoms: cough (dry), fever (subjective), headaches and shortness of breath    Associated symptoms: no abdominal pain, no chest pain, no diarrhea, no rash, no sore throat and no vomiting    Risk  factors:  History of diabetes. Patient denies smoking.      Similar Symptoms Previously: No.  Recently seen: Patient was seen for an office visit with Janet Camarillo on 11/17/2021 for a surgery follow up.      Patient Care Team  Primary Care Provider: Janet Camarillo APRN    Past Medical History:     Allergies   Allergen Reactions   • Cyclobenzaprine Irritability   • Ketorolac Tromethamine Hives     Past Medical History:   Diagnosis Date   • Anemia    • Arthritis    • Hypertension    • Obesity    • Type 1 diabetes (HCC)    • Type 2 diabetes mellitus (HCC)    • Vitamin D deficiency      Past Surgical History:   Procedure Laterality Date   • BARIATRIC SURGERY      Sleeve   • HYSTERECTOMY      total   • KNEE ARTHROSCOPY Left     diagnostic   • THYROID CYST EXCISION      aspiration   • THYROIDECTOMY, PARTIAL       Family History   Problem Relation Age of Onset   • Heart disease Mother    • Diabetes Mother    • Heart disease Father    • Emphysema Father    • Throat cancer Paternal Grandfather        Home Medications:  Prior to Admission medications    Medication Sig Start Date End Date Taking? Authorizing Provider   aspirin 81 MG chewable tablet Chew 81 mg Daily.    ProviderMirella MD   Blood Glucose Monitoring Suppl (Assure Pro Blood Glucose Meter) device 1 each Daily. Use as directed to test blood sugar daily. Dx: E11.9    Mirella Mccarty MD   ferrous sulfate 325 (65 FE) MG tablet Take 325 mg by mouth Daily With Breakfast.    ProviderMirella MD   glucose blood test strip Use as instructed 11/17/21   Janet Camarillo APRN   glucose blood test strip 100 each by Other route 2 (Two) Times a Day. Use as instructed to test blood sugar daily.  Dx: E11.9 12/3/21   Janet Camarillo APRN   insulin regular (humuLIN R,novoLIN R) 100 UNIT/ML injection Inject 5 Units under the skin into the appropriate area as directed 3 (Three) Times a Day Before Meals. 11/17/21   Janet Camarillo APRN   Insulin Syringes,  "Disposable, U-100 1 ML misc 1 each 3 (Three) Times a Day. 11/17/21   Janet Camarillo APRN   losartan (Cozaar) 25 MG tablet Take 1 tablet by mouth Daily. 8/25/21   Janet Camarillo APRN   nystatin (MYCOSTATIN) 260986 UNIT/GM cream APPLY TO THE AFFECTED AREA TWICE A DAY 4/20/21   Provider, MD Mirella   vitamin D (ERGOCALCIFEROL) 1.25 MG (44673 UT) capsule capsule Take 1 capsule by mouth 1 (One) Time Per Week. 11/17/21   Janet Camarillo APRN        Social History:   Social History     Tobacco Use   • Smoking status: Never Smoker   • Smokeless tobacco: Never Used   Vaping Use   • Vaping Use: Never used   Substance Use Topics   • Alcohol use: Never   • Drug use: Never         Review of Systems:  Review of Systems   Constitutional: Positive for fever (subjective). Negative for chills.   HENT: Negative for nosebleeds and sore throat.    Eyes: Negative for redness.   Respiratory: Positive for cough (dry) and shortness of breath.    Cardiovascular: Negative for chest pain and leg swelling.   Gastrointestinal: Negative for abdominal pain, diarrhea and vomiting.   Genitourinary: Negative for dysuria and frequency.   Musculoskeletal: Negative for back pain and neck pain.   Skin: Negative for rash.   Neurological: Positive for headaches. Negative for seizures and syncope.        Physical Exam:  /88 (BP Location: Right arm, Patient Position: Sitting)   Pulse 105   Temp 99.6 °F (37.6 °C) (Oral)   Resp 28   Ht 160 cm (63\")   Wt 106 kg (234 lb 5.6 oz)   SpO2 95%   BMI 41.51 kg/m²     Physical Exam  Vitals and nursing note reviewed.   Constitutional:       General: She is not in acute distress.     Appearance: Normal appearance.   HENT:      Head: Normocephalic and atraumatic.      Nose: Nose normal.      Mouth/Throat:      Pharynx: Oropharynx is clear. No posterior oropharyngeal erythema.   Eyes:      General: No scleral icterus.     Conjunctiva/sclera: Conjunctivae normal.   Cardiovascular:      Rate and " Rhythm: Tachycardia present. Rhythm irregular.      Heart sounds: Normal heart sounds. No murmur heard.      Pulmonary:      Effort: No accessory muscle usage, respiratory distress or retractions.      Breath sounds: Examination of the right-lower field reveals rales. Examination of the left-lower field reveals rales. Wheezing (faint and bilateral in the upper lungs) and rales present. No decreased breath sounds or rhonchi.   Chest:      Chest wall: No tenderness.   Abdominal:      Palpations: Abdomen is soft.      Tenderness: There is no abdominal tenderness. There is no guarding or rebound.      Comments: No rigidity.   Musculoskeletal:         General: No tenderness. Normal range of motion.      Cervical back: Normal range of motion and neck supple.      Comments: Bilateral chronic lymphedema. No pitting edema. No calf tenderness. Negative Janneth's sign bilaterally.   Skin:     General: Skin is warm and dry.   Neurological:      Mental Status: She is alert. Mental status is at baseline.   Psychiatric:         Mood and Affect: Mood normal.         Behavior: Behavior normal.                Medications in the Emergency Department:  Medications   furosemide (LASIX) injection 60 mg (60 mg Intravenous Given 1/11/22 0146)   ipratropium-albuterol (DUO-NEB) nebulizer solution 3 mL (3 mL Nebulization Given 1/11/22 0225)   amoxicillin-clavulanate (AUGMENTIN) 875-125 MG per tablet 1 tablet (1 tablet Oral Given 1/11/22 0521)        Labs  Lab Results (last 24 hours)     ** No results found for the last 24 hours. **           Imaging:  No Radiology Exams Resulted Within Past 24 Hours    Procedures:  Procedures    Progress  ED Course as of 01/12/22 0557   Mon Zohaib 10, 2022   2123 EKG:    Rhythm: 112  Rate: Atrial fibrillation  Intervals: Normal QT interval  T-wave: Fuhs T wave flattening which is nonspecific  ST Segment: No pathological ST elevation or ST depression    EKG Comparison: EKG is unchanged QRS and ST morphology from  EKG performed August 9, 2021    Interpreted by me   [SD]   Tue Jan 11, 2022   0415 At bedside reevaluating the patient and updating on lab and imaging results. Patient is agreeable. All questions addressed. [RF]      ED Course User Index  [RF] Maria Isabel Clemons  [SD] Haris Huber DO                            Medical Decision Making:  MDM  Number of Diagnoses or Management Options  Chronic pulmonary edema  Congestive heart failure, unspecified HF chronicity, unspecified heart failure type (HCC)  Longstanding persistent atrial fibrillation (HCC)  Multifocal pneumonia  Uncontrolled type 2 diabetes mellitus with hyperglycemia (HCC)  Viral syndrome  Diagnosis management comments:     The patient today presented with fever, cough, shortness of breath.  The patient's evaluation here demonstrated a low-grade temperature of 99.6 otherwise vital signs were stable with the exception of a slight tachycardia heart rate at the time of disposition was 105.  The patient did have a normal room air pulse ox.  The patient's troponin was normal.  The patient's BNP was slightly elevated at 3000.  There is no other BNP for comparison in epic.  The patient's chemistry essentially was within normal months with the exclusion of a slightly elevated glucose at 226 and a slightly low potassium at 3.4.  The patient's CBC was normal.  Patient was tested for COVID-19 and it was pending at the time of discharge.  The patient's flu is negative.  The patient's chest x-ray demonstrated findings most suggestive of pulmonary edema although multifocal pneumonia could not be ruled out.  Patient was given some Lasix for the slight pulmonary edema.  However, due to the patient's fever at home and generally not feeling well was felt that the patient more likely had multifocal pneumonia.  I attempted to ambulate the patient in the emergency room.  The patient ambulated for several minutes and her sats dropped down to 84%.  At this point, I recommended  admission to the hospital due to the hypoxia and multifocal pneumonia.  The patient adamantly refused being admitted to the hospital.  The patient understands the consequences of hypoxia resulting in organ damage possible altered mental status, syncope or even death.  The patient was alert, oriented x3 cooperative and pleasant.  The patient had the capacity to make her own medical decision making.  The patient's son was at bedside.  He tried to encourage her to stay in the hospital as well.  However the patient refused.  The patient is being discharged at her request and against my recommendation.  The patient will stay quarantined at home until she can review her COVID-19 results with her primary care physician.  The patient was given a prescription for albuterol and Augmentin as well as a 3-day supply of Lasix..  The patient states that she will return to the emergency room if she changes her mind and wants admission to the hospital or has worsening symptoms.  The patient will also follow-up with her primary care physician the following day discussed possible need for cardiology referral.  The patient is going home with her son.  He understands that she is leaving against my recommendation.       Amount and/or Complexity of Data Reviewed  Clinical lab tests: reviewed  Tests in the radiology section of CPT®: reviewed  Tests in the medicine section of CPT®: reviewed                 Final diagnoses:   Viral syndrome   Multifocal pneumonia   Chronic pulmonary edema   Congestive heart failure, unspecified HF chronicity, unspecified heart failure type (HCC)   Longstanding persistent atrial fibrillation (HCC)   Uncontrolled type 2 diabetes mellitus with hyperglycemia (HCC)        Disposition:  ED Disposition     ED Disposition Condition Comment    Discharge Stable           Part of this note may be an electronic transcription/translation of spoken language to printed text using the Dragon Dictation System.      Documentation assistance provided by Maria Isabel Clemons acting as scribe for Haris Huber DO. Information recorded by the scribe was done at my direction and has been verified and validated by me.        Maria Isabel Clemons  01/11/22 0001       Maria Isabel Clemons  01/11/22 0019       Maria Isabel Clemons  01/11/22 0139       Maria Isabel Clemons  01/11/22 0416       Haris Huber DO  01/12/22 0545

## 2022-01-11 NOTE — TELEPHONE ENCOUNTER
Caller: HAYSSYED    Relationship: Emergency Contact    Best call back number: [phone]    Requested Prescriptions:   Requested Prescriptions     Pending Prescriptions Disp Refills   • Blood Glucose Monitoring Suppl (Assure Pro Blood Glucose Meter) device       Si each Daily. Use as directed to test blood sugar daily. Dx: E11.9   • glucose blood test strip 100 each 5     Sig: Use as instructed   • glucose blood test strip 100 each 3     Si each by Other route 2 (Two) Times a Day. Use as instructed to test blood sugar daily.  Dx: E11.9    TEST [facility]     Pharmacy where request should be sent: [address] [phone] [facility] [phone]      Additional details provided by patient:     Does the patient have less than a 3 day supply:  [x] Yes  [] No    Delfin Garcia Rep   22 11:30 EST

## 2022-01-12 NOTE — TELEPHONE ENCOUNTER
Patient had this done at the hospital and patient is aware that the hospital will call her with results.

## 2022-01-13 RX ORDER — BLOOD-GLUCOSE METER
1 KIT MISCELLANEOUS DAILY
Qty: 100 EACH | Refills: 3 | Status: SHIPPED | OUTPATIENT
Start: 2022-01-13

## 2022-02-22 ENCOUNTER — OFFICE VISIT (OUTPATIENT)
Dept: FAMILY MEDICINE CLINIC | Facility: CLINIC | Age: 67
End: 2022-02-22

## 2022-02-22 VITALS
HEIGHT: 63 IN | OXYGEN SATURATION: 97 % | BODY MASS INDEX: 38.52 KG/M2 | DIASTOLIC BLOOD PRESSURE: 78 MMHG | WEIGHT: 217.4 LBS | SYSTOLIC BLOOD PRESSURE: 152 MMHG | HEART RATE: 100 BPM

## 2022-02-22 DIAGNOSIS — Z11.59 NEED FOR HEPATITIS C SCREENING TEST: ICD-10-CM

## 2022-02-22 DIAGNOSIS — E11.43 TYPE 2 DIABETES MELLITUS WITH DIABETIC AUTONOMIC NEUROPATHY, WITH LONG-TERM CURRENT USE OF INSULIN: ICD-10-CM

## 2022-02-22 DIAGNOSIS — E55.9 VITAMIN D DEFICIENCY: ICD-10-CM

## 2022-02-22 DIAGNOSIS — I10 ESSENTIAL HYPERTENSION: ICD-10-CM

## 2022-02-22 DIAGNOSIS — Z12.31 SCREENING MAMMOGRAM FOR BREAST CANCER: ICD-10-CM

## 2022-02-22 DIAGNOSIS — E11.43 TYPE 2 DIABETES MELLITUS WITH DIABETIC AUTONOMIC NEUROPATHY, WITH LONG-TERM CURRENT USE OF INSULIN: Primary | ICD-10-CM

## 2022-02-22 DIAGNOSIS — Z79.4 TYPE 2 DIABETES MELLITUS WITH DIABETIC AUTONOMIC NEUROPATHY, WITH LONG-TERM CURRENT USE OF INSULIN: ICD-10-CM

## 2022-02-22 DIAGNOSIS — Z98.84 STATUS POST BARIATRIC SURGERY: ICD-10-CM

## 2022-02-22 DIAGNOSIS — Z79.4 TYPE 2 DIABETES MELLITUS WITH DIABETIC AUTONOMIC NEUROPATHY, WITH LONG-TERM CURRENT USE OF INSULIN: Primary | ICD-10-CM

## 2022-02-22 DIAGNOSIS — Z23 NEED FOR VACCINATION AGAINST STREPTOCOCCUS PNEUMONIAE USING PNEUMOCOCCAL CONJUGATE VACCINE 13: ICD-10-CM

## 2022-02-22 PROCEDURE — 99214 OFFICE O/P EST MOD 30 MIN: CPT | Performed by: NURSE PRACTITIONER

## 2022-02-22 RX ORDER — LOSARTAN POTASSIUM 25 MG/1
25 TABLET ORAL DAILY
Qty: 90 TABLET | Refills: 1 | Status: SHIPPED | OUTPATIENT
Start: 2022-02-22 | End: 2022-08-23 | Stop reason: SDUPTHER

## 2022-02-22 NOTE — PROGRESS NOTES
Chief Complaint  Diabetes (Humulin- patient is doing well with this. Her last A1c was 8.4% on 02/25/2021. ), Hypertension (Losartan, lasix patient does not check it at home. Patient states that she is doing well and denies headaches chest pain or LLE. ), Vitamin D Deficiency (Patient takes Vitamin D once a week but has not taken it in a while because insurance stops paying for this. ), and Obesity    Subjective          Anita Lemus presents to [facility]  History of Present Illness    Diabetes (Humulin- patient is doing well with this. Her last A1c was 8.4% on 02/25/2021. ),     Hypertension (Losartan, lasix patient does not check it at home. Patient states that she is doing well and denies headaches chest pain or LLE. Pt was changed from lisinopril to losartan at last OV due to cough and she states the cough has completely resolved and she is doing well.      Vitamin D Deficiency (Patient takes Vitamin D once a week but has not taken it in a while because insurance stops paying for this.     Obesity-pt is s/p bariatric surgery and has lost a total of 101 pounds. She is feeling great.    Past Medical History:   Diagnosis Date   • Anemia    • Arthritis    • Hypertension    • Obesity    • Type 1 diabetes (HCC)    • Type 2 diabetes mellitus (HCC)    • Vitamin D deficiency          Allergies   Allergen Reactions   • Cyclobenzaprine Irritability   • Ketorolac Tromethamine Hives          Past Surgical History:   Procedure Laterality Date   • BARIATRIC SURGERY      Sleeve   • HYSTERECTOMY      total   • KNEE ARTHROSCOPY Left     diagnostic   • THYROID CYST EXCISION      aspiration   • THYROIDECTOMY, PARTIAL            Social History     Tobacco Use   • Smoking status: Never Smoker   • Smokeless tobacco: Never Used   Substance Use Topics   • Alcohol use: Never         Family History   Problem Relation Age of Onset   • Heart disease Mother    • Diabetes Mother    • Heart disease Father   "  • Emphysema Father    • Throat cancer Paternal Grandfather           Current Outpatient Medications on File Prior to Visit   Medication Sig   • albuterol sulfate  (90 Base) MCG/ACT inhaler Inhale 2 puffs Every 4 (Four) Hours As Needed for Wheezing.   • aspirin 81 MG chewable tablet Chew 81 mg Daily.   • Blood Glucose Monitoring Suppl (Assure Pro Blood Glucose Meter) device 1 each Daily. Use as directed to test blood sugar daily. Dx: E11.9   • ferrous sulfate 325 (65 FE) MG tablet Take 325 mg by mouth Daily With Breakfast.   • glucose blood test strip Use as instructed   • glucose blood test strip 100 each by Other route 2 (Two) Times a Day. Use as instructed to test blood sugar daily.  Dx: E11.9   • Insulin Syringes, Disposable, U-100 1 ML misc 1 each 3 (Three) Times a Day.   • nystatin (MYCOSTATIN) 743008 UNIT/GM cream APPLY TO THE AFFECTED AREA TWICE A DAY   • vitamin D (ERGOCALCIFEROL) 1.25 MG (18438 UT) capsule capsule Take 1 capsule by mouth 1 (One) Time Per Week.   • [DISCONTINUED] insulin regular (humuLIN R,novoLIN R) 100 UNIT/ML injection Inject 5 Units under the skin into the appropriate area as directed 3 (Three) Times a Day Before Meals.   • [DISCONTINUED] losartan (Cozaar) 25 MG tablet Take 1 tablet by mouth Daily.   • furosemide (LASIX) 20 MG tablet Take 1 tablet by mouth Daily for 3 days.     No current facility-administered medications on file prior to visit.         Immunization History   Administered Date(s) Administered   • COVID-19 (MODERNA) 1st, 2nd, 3rd Dose Only 05/10/2021, 06/04/2021   • Flu Vaccine Intradermal Quad 18-64YR 10/27/2021   • Flu Vaccine Split Quad 02/21/2017, 02/21/2017   • Influenza TIV (IM) 10/15/2020, 02/24/2021   • Influenza, Unspecified 02/24/2021   • Pneumococcal Conjugate 13-Valent (PCV13) 02/22/2022         /78   Pulse 100   Ht 160 cm (63\")   Wt 98.6 kg (217 lb 6.4 oz)   SpO2 97%   BMI 38.51 kg/m²             Physical Exam  Vitals reviewed. "   Constitutional:       Appearance: Normal appearance. She is well-developed.   HENT:      Head: Normocephalic and atraumatic.      Right Ear: External ear normal.      Left Ear: External ear normal.      Mouth/Throat:      Pharynx: No oropharyngeal exudate.   Eyes:      Conjunctiva/sclera: Conjunctivae normal.      Pupils: Pupils are equal, round, and reactive to light.   Cardiovascular:      Rate and Rhythm: Normal rate and regular rhythm.      Heart sounds: No murmur heard.  No friction rub. No gallop.    Pulmonary:      Effort: Pulmonary effort is normal.      Breath sounds: Normal breath sounds. No wheezing or rhonchi.   Skin:     General: Skin is warm and dry.   Neurological:      Mental Status: She is alert and oriented to person, place, and time.      Cranial Nerves: No cranial nerve deficit.   Psychiatric:         Mood and Affect: Mood and affect normal.         Behavior: Behavior normal.         Thought Content: Thought content normal.         Judgment: Judgment normal.             Result Review :                           Assessment and Plan      Diagnoses and all orders for this visit:    1. Type 2 diabetes mellitus with diabetic autonomic neuropathy, with long-term current use of insulin ([facility]) (Primary)  -     insulin regular (humuLIN R,novoLIN R) 100 UNIT/ML injection; Inject 5 Units under the skin into the appropriate area as directed 3 (Three) Times a Day Before Meals.  Dispense: 1 each; Refill: 1    2. Vitamin D deficiency    3. Status post bariatric surgery    4. Need for hepatitis C screening test    5. Essential hypertension  Comments:  cont losartan  Orders:  -     losartan (Cozaar) 25 MG tablet; Take 1 tablet by mouth Daily.  Dispense: 90 tablet; Refill: 1    6. Type 2 diabetes mellitus with diabetic autonomic neuropathy, with long-term current use of insulin ([facility])  Comments:  Continue close blood sugar monitoring will refill Humulin R  Orders:  -     insulin regular (humuLIN R,novoLIN R) 100  UNIT/ML injection; Inject 5 Units under the skin into the appropriate area as directed 3 (Three) Times a Day Before Meals.  Dispense: 1 each; Refill: 1    7. Screening mammogram for breast cancer  -     Mammo Screening Digital Tomosynthesis Bilateral With CAD; Future    8. Need for vaccination against Streptococcus pneumoniae using pneumococcal conjugate vaccine 13  -     pneumococcal conj. 13-valent (PREVNAR-13) vaccine 0.5 mL      “Discussed risks/benefits to vaccination, reviewed components of the vaccine, discussed VIS, discussed informed consent, informed consent obtained. Patient/Parent was allowed to accept or refuse vaccine. Questions answered to satisfactory state of patient/Parent. We reviewed typical age appropriate and seasonally appropriate vaccinations. Reviewed immunization history and updated state vaccination form as needed. Patient was counseled on PCV13    Pt given written order today for labwork to be drawn.         Follow Up     Return in about 4 months (around 6/22/2022).    Patient was given instructions and counseling regarding her condition or for health maintenance advice. Please see specific information pulled into the AVS if appropriate.

## 2022-03-28 ENCOUNTER — TELEPHONE (OUTPATIENT)
Dept: FAMILY MEDICINE CLINIC | Facility: CLINIC | Age: 67
End: 2022-03-28

## 2022-03-28 DIAGNOSIS — Z11.59 NEED FOR HEPATITIS C SCREENING TEST: ICD-10-CM

## 2022-03-28 DIAGNOSIS — I10 BENIGN HYPERTENSION: ICD-10-CM

## 2022-03-28 DIAGNOSIS — Z79.4 TYPE 2 DIABETES MELLITUS WITH DIABETIC AUTONOMIC NEUROPATHY, WITH LONG-TERM CURRENT USE OF INSULIN: Primary | ICD-10-CM

## 2022-03-28 DIAGNOSIS — E55.9 VITAMIN D DEFICIENCY: ICD-10-CM

## 2022-03-28 DIAGNOSIS — E11.43 TYPE 2 DIABETES MELLITUS WITH DIABETIC AUTONOMIC NEUROPATHY, WITH LONG-TERM CURRENT USE OF INSULIN: Primary | ICD-10-CM

## 2022-04-12 ENCOUNTER — TELEPHONE (OUTPATIENT)
Dept: FAMILY MEDICINE CLINIC | Facility: CLINIC | Age: 67
End: 2022-04-12

## 2022-04-12 NOTE — TELEPHONE ENCOUNTER
Called patient to remind her to complete outstanding labs, she states she will be having them drawn at [facility] on 4/28/22.

## 2022-04-26 ENCOUNTER — APPOINTMENT (OUTPATIENT)
Dept: MAMMOGRAPHY | Facility: HOSPITAL | Age: 67
End: 2022-04-26

## 2022-04-29 ENCOUNTER — HOSPITAL ENCOUNTER (OUTPATIENT)
Dept: MAMMOGRAPHY | Facility: HOSPITAL | Age: 67
Discharge: HOME | End: 2022-04-29
Admitting: NURSE PRACTITIONER

## 2022-04-29 DIAGNOSIS — Z12.31 SCREENING MAMMOGRAM FOR BREAST CANCER: ICD-10-CM

## 2022-04-29 PROCEDURE — 77067 SCR MAMMO BI INCL CAD: CPT

## 2022-04-29 PROCEDURE — 77063 BREAST TOMOSYNTHESIS BI: CPT

## 2022-05-02 ENCOUNTER — TELEPHONE (OUTPATIENT)
Dept: FAMILY MEDICINE CLINIC | Facility: CLINIC | Age: 67
End: 2022-05-02

## 2022-05-02 NOTE — TELEPHONE ENCOUNTER
----- Message from PARADISE Bateman sent at 5/2/2022  3:55 PM EDT -----  Normal mammogram, repeat in 1 year.

## 2022-05-12 ENCOUNTER — TELEPHONE (OUTPATIENT)
Dept: FAMILY MEDICINE CLINIC | Facility: CLINIC | Age: 67
End: 2022-05-12

## 2022-06-20 ENCOUNTER — TELEPHONE (OUTPATIENT)
Dept: FAMILY MEDICINE CLINIC | Facility: CLINIC | Age: 67
End: 2022-06-20

## 2022-06-20 NOTE — TELEPHONE ENCOUNTER
Caller: [employer]. [address] [phone] [facility] [phone] FX    Relationship: Pharmacy    Best call back number: [phone]    Requested Prescriptions:   Requested Prescriptions      No prescriptions requested or ordered in this encounter      GLUCOSE MONITOR     Pharmacy where request should be sent: [facility] [address] [place] [address] [phone] [facility] [phone] FX  [employer]. [address] [phone] [facility] [phone] FX     Additional details provided by patient: NEW PRESCRIPTION FOR GLUCOSE MONITOR     Does the patient have less than a 3 day supply:  [x] Yes  [] No    Delfin Long Rep   06/20/22 10:08 EDT

## 2022-06-20 NOTE — TELEPHONE ENCOUNTER
I spoke to pharmacy, patient does not need pre-cert for meter and supplies.  Pharm states they were able to get meter and supplies to patient that were covered by her insurance.

## 2022-06-20 NOTE — TELEPHONE ENCOUNTER
Caller: Anita Lemus    Relationship: Self    Best call back number: [phone]    What is the best time to reach you: ANYTIME     Who are you requesting to speak with (clinical staff, provider,  specific staff member): NURSE     What was the call regarding: PATIENT NEEDS TO HAVE A PRIOR AUTHORIZATION ON METER TEST STRIPS AND ANYTHING THAT GOES WITH THAT.   PLEASE CALL [facility] IN [place] AT [phone].  DUE TO NEW INSURANCE     Do you require a callback:

## 2022-08-23 ENCOUNTER — OFFICE VISIT (OUTPATIENT)
Dept: FAMILY MEDICINE CLINIC | Facility: CLINIC | Age: 67
End: 2022-08-23

## 2022-08-23 ENCOUNTER — LAB (OUTPATIENT)
Dept: LAB | Facility: HOSPITAL | Age: 67
End: 2022-08-23

## 2022-08-23 ENCOUNTER — TRANSCRIBE ORDERS (OUTPATIENT)
Dept: LAB | Facility: HOSPITAL | Age: 67
End: 2022-08-23

## 2022-08-23 VITALS
BODY MASS INDEX: 38.07 KG/M2 | SYSTOLIC BLOOD PRESSURE: 124 MMHG | DIASTOLIC BLOOD PRESSURE: 81 MMHG | TEMPERATURE: 97.6 F | WEIGHT: 214.85 LBS | HEIGHT: 63 IN | OXYGEN SATURATION: 100 % | HEART RATE: 87 BPM

## 2022-08-23 DIAGNOSIS — I10 ESSENTIAL HYPERTENSION: Primary | ICD-10-CM

## 2022-08-23 DIAGNOSIS — Z11.59 NEED FOR HEPATITIS C SCREENING TEST: ICD-10-CM

## 2022-08-23 DIAGNOSIS — E10.9 INSULIN DEPENDENT DIABETES MELLITUS TYPE IA: Primary | ICD-10-CM

## 2022-08-23 DIAGNOSIS — Z13.220 LIPID SCREENING: ICD-10-CM

## 2022-08-23 DIAGNOSIS — Z13.6 ENCOUNTER FOR LIPID SCREENING FOR CARDIOVASCULAR DISEASE: ICD-10-CM

## 2022-08-23 DIAGNOSIS — E10.9 INSULIN DEPENDENT DIABETES MELLITUS TYPE IA: ICD-10-CM

## 2022-08-23 DIAGNOSIS — R06.2 WHEEZING: ICD-10-CM

## 2022-08-23 DIAGNOSIS — Z98.84 STATUS POST BARIATRIC SURGERY: ICD-10-CM

## 2022-08-23 DIAGNOSIS — E55.9 VITAMIN D DEFICIENCY: ICD-10-CM

## 2022-08-23 DIAGNOSIS — B37.2 INTERTRIGINOUS CANDIDIASIS: ICD-10-CM

## 2022-08-23 DIAGNOSIS — Z79.4 TYPE 2 DIABETES MELLITUS WITH DIABETIC AUTONOMIC NEUROPATHY, WITH LONG-TERM CURRENT USE OF INSULIN: ICD-10-CM

## 2022-08-23 DIAGNOSIS — Z79.899 ENCOUNTER FOR LONG-TERM (CURRENT) USE OF OTHER MEDICATIONS: ICD-10-CM

## 2022-08-23 DIAGNOSIS — E11.43 TYPE 2 DIABETES MELLITUS WITH DIABETIC AUTONOMIC NEUROPATHY, WITH LONG-TERM CURRENT USE OF INSULIN: ICD-10-CM

## 2022-08-23 DIAGNOSIS — Z13.220 ENCOUNTER FOR LIPID SCREENING FOR CARDIOVASCULAR DISEASE: ICD-10-CM

## 2022-08-23 DIAGNOSIS — Z13.29 SCREENING FOR THYROID DISORDER: ICD-10-CM

## 2022-08-23 DIAGNOSIS — I10 ESSENTIAL HYPERTENSION: ICD-10-CM

## 2022-08-23 LAB
25(OH)D3 SERPL-MCNC: 41 NG/ML (ref 30–100)
ALBUMIN SERPL-MCNC: 4.2 G/DL (ref 3.5–5.2)
ALBUMIN/GLOB SERPL: 2.2 G/DL
ALP SERPL-CCNC: 118 U/L (ref 39–117)
ALT SERPL W P-5'-P-CCNC: 17 U/L (ref 1–33)
ANION GAP SERPL CALCULATED.3IONS-SCNC: 8 MMOL/L (ref 5–15)
AST SERPL-CCNC: 22 U/L (ref 1–32)
BASOPHILS # BLD AUTO: 0.05 10*3/MM3 (ref 0–0.2)
BASOPHILS NFR BLD AUTO: 0.7 % (ref 0–1.5)
BILIRUB SERPL-MCNC: 0.6 MG/DL (ref 0–1.2)
BUN SERPL-MCNC: 13 MG/DL (ref 8–23)
BUN/CREAT SERPL: 16.7 (ref 7–25)
CALCIUM SPEC-SCNC: 9.4 MG/DL (ref 8.6–10.5)
CHLORIDE SERPL-SCNC: 103 MMOL/L (ref 98–107)
CHOLEST SERPL-MCNC: 193 MG/DL (ref 0–200)
CO2 SERPL-SCNC: 29 MMOL/L (ref 22–29)
CREAT SERPL-MCNC: 0.78 MG/DL (ref 0.57–1)
DEPRECATED RDW RBC AUTO: 48.5 FL (ref 37–54)
EGFRCR SERPLBLD CKD-EPI 2021: 83.4 ML/MIN/1.73
EOSINOPHIL # BLD AUTO: 0.2 10*3/MM3 (ref 0–0.4)
EOSINOPHIL NFR BLD AUTO: 2.9 % (ref 0.3–6.2)
ERYTHROCYTE [DISTWIDTH] IN BLOOD BY AUTOMATED COUNT: 13.8 % (ref 12.3–15.4)
GLOBULIN UR ELPH-MCNC: 1.9 GM/DL
GLUCOSE SERPL-MCNC: 201 MG/DL (ref 65–99)
HBA1C MFR BLD: 6.3 % (ref 4.8–5.6)
HCT VFR BLD AUTO: 41.7 % (ref 34–46.6)
HCV AB SER DONR QL: NORMAL
HDLC SERPL-MCNC: 68 MG/DL (ref 40–60)
HGB BLD-MCNC: 13.4 G/DL (ref 12–15.9)
IMM GRANULOCYTES # BLD AUTO: 0.01 10*3/MM3 (ref 0–0.05)
IMM GRANULOCYTES NFR BLD AUTO: 0.1 % (ref 0–0.5)
LDLC SERPL CALC-MCNC: 108 MG/DL (ref 0–100)
LDLC/HDLC SERPL: 1.55 {RATIO}
LYMPHOCYTES # BLD AUTO: 1.54 10*3/MM3 (ref 0.7–3.1)
LYMPHOCYTES NFR BLD AUTO: 22.6 % (ref 19.6–45.3)
MCH RBC QN AUTO: 30.4 PG (ref 26.6–33)
MCHC RBC AUTO-ENTMCNC: 32.1 G/DL (ref 31.5–35.7)
MCV RBC AUTO: 94.6 FL (ref 79–97)
MONOCYTES # BLD AUTO: 0.38 10*3/MM3 (ref 0.1–0.9)
MONOCYTES NFR BLD AUTO: 5.6 % (ref 5–12)
NEUTROPHILS NFR BLD AUTO: 4.62 10*3/MM3 (ref 1.7–7)
NEUTROPHILS NFR BLD AUTO: 68.1 % (ref 42.7–76)
NRBC BLD AUTO-RTO: 0 /100 WBC (ref 0–0.2)
PLATELET # BLD AUTO: 181 10*3/MM3 (ref 140–450)
PMV BLD AUTO: 9.9 FL (ref 6–12)
POTASSIUM SERPL-SCNC: 4.4 MMOL/L (ref 3.5–5.2)
PROT SERPL-MCNC: 6.1 G/DL (ref 6–8.5)
RBC # BLD AUTO: 4.41 10*6/MM3 (ref 3.77–5.28)
SODIUM SERPL-SCNC: 140 MMOL/L (ref 136–145)
TRIGL SERPL-MCNC: 97 MG/DL (ref 0–150)
TSH SERPL DL<=0.05 MIU/L-ACNC: 0.97 UIU/ML (ref 0.27–4.2)
VLDLC SERPL-MCNC: 17 MG/DL (ref 5–40)
WBC NRBC COR # BLD: 6.8 10*3/MM3 (ref 3.4–10.8)

## 2022-08-23 PROCEDURE — 83036 HEMOGLOBIN GLYCOSYLATED A1C: CPT

## 2022-08-23 PROCEDURE — 80053 COMPREHEN METABOLIC PANEL: CPT

## 2022-08-23 PROCEDURE — 80061 LIPID PANEL: CPT

## 2022-08-23 PROCEDURE — 82306 VITAMIN D 25 HYDROXY: CPT

## 2022-08-23 PROCEDURE — 86803 HEPATITIS C AB TEST: CPT

## 2022-08-23 PROCEDURE — 84443 ASSAY THYROID STIM HORMONE: CPT

## 2022-08-23 PROCEDURE — 85025 COMPLETE CBC W/AUTO DIFF WBC: CPT

## 2022-08-23 PROCEDURE — 36415 COLL VENOUS BLD VENIPUNCTURE: CPT

## 2022-08-23 PROCEDURE — 99214 OFFICE O/P EST MOD 30 MIN: CPT | Performed by: NURSE PRACTITIONER

## 2022-08-23 RX ORDER — NYSTATIN 100000 U/G
CREAM TOPICAL AS NEEDED
Qty: 15 G | Refills: 1 | Status: SHIPPED | OUTPATIENT
Start: 2022-08-23

## 2022-08-23 RX ORDER — LOSARTAN POTASSIUM 25 MG/1
25 TABLET ORAL DAILY
Qty: 90 TABLET | Refills: 1 | Status: SHIPPED | OUTPATIENT
Start: 2022-08-23

## 2022-08-23 RX ORDER — ALBUTEROL SULFATE 90 UG/1
2 AEROSOL, METERED RESPIRATORY (INHALATION) EVERY 4 HOURS PRN
Qty: 8 G | Refills: 0 | Status: SHIPPED | OUTPATIENT
Start: 2022-08-23

## 2022-08-23 NOTE — PROGRESS NOTES
Chief Complaint  Follow-up, Diabetes, Hypertension, and Vitamin D Deficiency    SUBJECTIVE  Anita Lemus presents to [facility]     Diabetes Mellitus, type 2: Patient is taking Humulin R. Patient is compliant with medications.  Patient's last A1c is 8.4% on 2/25/21, she states she has had new result per Dr. Hamlin. Patient monitors blood sugar at home with average readings of 50s-190s.  Patient denies extreme high and low blood sugars.  Patient's last DM eye exam was a few years ago.  Patient's last DM foot exam was less than 1 year ago, she can't remember who she had it with.  Patient denies any unhealing sores. Patient attempts to monitor carbohydrate/ sugar intake in diet.     Hypertension:  Patient is taking Losartan, she stopped taking HCTZ when she ran out and did not refill.  Patient's Blood Pressure in clinic today is 139/120, on re-check it is 124/81.  Patient does not monitor blood pressure at home.  Patient denies chest pain, shortness of air, headache, flushing, abnormal swelling in feet/ankles.    Vitamin D Deficiency:  Patient did not start Vitamin D2 due to insurance cost.    Patient continues to follow up with Dr. Cali, Bariatric Surgeon, every 3 months.    History of Present Illness  Past Medical History:   Diagnosis Date   • Anemia    • Arthritis    • Hypertension    • Obesity    • Type 1 diabetes (HCC)    • Type 2 diabetes mellitus (HCC)    • Vitamin D deficiency       Family History   Problem Relation Age of Onset   • Heart disease Mother    • Diabetes Mother    • Heart disease Father    • Emphysema Father    • Throat cancer Paternal Grandfather       Past Surgical History:   Procedure Laterality Date   • BARIATRIC SURGERY      Sleeve   • HYSTERECTOMY      total   • KNEE ARTHROSCOPY Left     diagnostic   • THYROID CYST EXCISION      aspiration   • THYROIDECTOMY, PARTIAL          Current Outpatient Medications:   •  albuterol sulfate  (90 Base) MCG/ACT  "inhaler, Inhale 2 puffs Every 4 (Four) Hours As Needed for Wheezing., Disp: 8 g, Rfl: 0  •  aspirin 81 MG chewable tablet, Chew 81 mg Daily., Disp: , Rfl:   •  Blood Glucose Monitoring Suppl (Assure Pro Blood Glucose Meter) device, 1 each Daily. Use as directed to test blood sugar daily. Dx: E11.9, Disp: 100 each, Rfl: 3  •  glucose blood test strip, Use as instructed, Disp: 100 each, Rfl: 5  •  glucose blood test strip, 100 each by Other route 2 (Two) Times a Day. Use as instructed to test blood sugar daily. Dx: E11.9, Disp: 100 each, Rfl: 3  •  insulin regular (humuLIN R,novoLIN R) 100 UNIT/ML injection, Inject 5 Units under the skin into the appropriate area as directed 3 (Three) Times a Day Before Meals., Disp: 1 each, Rfl: 1  •  Insulin Syringes, Disposable, U-100 1 ML misc, 1 each 3 (Three) Times a Day., Disp: 100 each, Rfl: 3  •  losartan (Cozaar) 25 MG tablet, Take 1 tablet by mouth Daily., Disp: 90 tablet, Rfl: 1  •  nystatin (MYCOSTATIN) 834930 UNIT/GM cream, Apply  topically to the appropriate area as directed As Needed (rash)., Disp: 15 g, Rfl: 1    OBJECTIVE  Vital Signs:   /81 (BP Location: Left arm, Patient Position: Sitting, Cuff Size: Adult)   Pulse 87   Temp 97.6 °F (36.4 °C) (Oral)   Ht 160 cm (63\")   Wt 97.5 kg (214 lb 13.6 oz)   SpO2 100%   BMI 38.06 kg/m²    Estimated body mass index is 38.06 kg/m² as calculated from the following:    Height as of this encounter: 160 cm (63\").    Weight as of this encounter: 97.5 kg (214 lb 13.6 oz).     Wt Readings from Last 3 Encounters:   08/23/22 97.5 kg (214 lb 13.6 oz)   02/22/22 98.6 kg (217 lb 6.4 oz)   01/10/22 106 kg (234 lb 5.6 oz)     BP Readings from Last 3 Encounters:   08/23/22 124/81   02/22/22 152/78   01/11/22 123/88       Physical Exam  Vitals reviewed.   Constitutional:       Appearance: Normal appearance. She is well-developed.   HENT:      Head: Normocephalic and atraumatic.      Right Ear: External ear normal.      Left Ear: " External ear normal.      Mouth/Throat:      Pharynx: No oropharyngeal exudate.   Eyes:      Conjunctiva/sclera: Conjunctivae normal.      Pupils: Pupils are equal, round, and reactive to light.   Cardiovascular:      Rate and Rhythm: Normal rate and regular rhythm.      Heart sounds: No murmur heard.    No friction rub. No gallop.   Pulmonary:      Effort: Pulmonary effort is normal.      Breath sounds: Normal breath sounds. No wheezing or rhonchi.   Skin:     General: Skin is warm and dry.   Neurological:      Mental Status: She is alert and oriented to person, place, and time.      Cranial Nerves: No cranial nerve deficit.   Psychiatric:         Mood and Affect: Mood and affect normal.         Behavior: Behavior normal.         Thought Content: Thought content normal.         Judgment: Judgment normal.          Result Review        Mammo Screening Digital Tomosynthesis Bilateral With CAD    Result Date: 4/30/2022  Benign mammogram. Suggest routine mammographic screening.  RECOMMENDATION(S):  ROUTINE MAMMOGRAM AND CLINICAL EVALUATION IN 12 MONTHS.   BIRADS:  DIAGNOSTIC CATEGORY 2--BENIGN FINDING   BREAST COMPOSITION: Scattered areas fibroglandular density.  PLEASE NOTE:  A NORMAL MAMMOGRAM DOES NOT EXCLUDE THE POSSIBILITY OF BREAST CANCER. ANY CLINICALLY SUSPICIOUS PALPABLE LUMP SHOULD BE BIOPSIED.      MIREYA LO MD       Electronically Signed and Approved By: MIREYA LO MD on 4/30/2022 at 8:55                 The above data has been reviewed by PARADISE Bateman 08/23/2022 11:48 EDT.          Patient Care Team:  Janet Camarillo APRN as PCP - General (Family Medicine)           ASSESSMENT & PLAN    Diagnoses and all orders for this visit:    1. Essential hypertension (Primary)  Comments:  BP well controlled, continue current medications.  Orders:  -     losartan (Cozaar) 25 MG tablet; Take 1 tablet by mouth Daily.  Dispense: 90 tablet; Refill: 1    2. Vitamin D deficiency    3. Status post bariatric  surgery    4. Screening for thyroid disorder    5. Encounter for lipid screening for cardiovascular disease    6. Wheezing  Comments:  Well-controlled, patient states she rarely has to use her albuterol inhaler.  Orders:  -     albuterol sulfate  (90 Base) MCG/ACT inhaler; Inhale 2 puffs Every 4 (Four) Hours As Needed for Wheezing.  Dispense: 8 g; Refill: 0    7. Type 2 diabetes mellitus with diabetic autonomic neuropathy, with long-term current use of insulin (HCC)  Comments:  Continue close blood sugar monitoring will refill Humulin R  Orders:  -     insulin regular (humuLIN R,novoLIN R) 100 UNIT/ML injection; Inject 5 Units under the skin into the appropriate area as directed 3 (Three) Times a Day Before Meals.  Dispense: 1 each; Refill: 1    8. Essential hypertension  Comments:  cont losartan  Orders:  -     losartan (Cozaar) 25 MG tablet; Take 1 tablet by mouth Daily.  Dispense: 90 tablet; Refill: 1    9. Intertriginous candidiasis  -     nystatin (MYCOSTATIN) 966258 UNIT/GM cream; Apply  topically to the appropriate area as directed As Needed (rash).  Dispense: 15 g; Refill: 1    Patient given written order to take to lab for blood work.    Tobacco Use: Low Risk    • Smoking Tobacco Use: Never Smoker   • Smokeless Tobacco Use: Never Used       Follow Up     Return in about 4 months (around 12/23/2022).      Patient was given instructions and counseling regarding her condition or for health maintenance advice. Please see specific information pulled into the AVS if appropriate.   I have reviewed information obtained and documented by others and I have confirmed the accuracy of this documented note.    PARADISE Bateman

## 2022-08-24 ENCOUNTER — TELEPHONE (OUTPATIENT)
Dept: PEDIATRICS | Facility: OTHER | Age: 67
End: 2022-08-24

## 2022-08-24 ENCOUNTER — TELEPHONE (OUTPATIENT)
Dept: FAMILY MEDICINE CLINIC | Facility: CLINIC | Age: 67
End: 2022-08-24

## 2022-08-24 DIAGNOSIS — Z79.4 TYPE 2 DIABETES MELLITUS WITH DIABETIC AUTONOMIC NEUROPATHY, WITH LONG-TERM CURRENT USE OF INSULIN: Primary | ICD-10-CM

## 2022-08-24 DIAGNOSIS — E11.43 TYPE 2 DIABETES MELLITUS WITH DIABETIC AUTONOMIC NEUROPATHY, WITH LONG-TERM CURRENT USE OF INSULIN: Primary | ICD-10-CM

## 2022-08-24 NOTE — TELEPHONE ENCOUNTER
Caller: Anita Lemus    Relationship: Self    Best call back number: [phone]    Requested Prescriptions:   Requested Prescriptions     Pending Prescriptions Disp Refills   • glucose blood test strip 100 each 3     Si each by Other route 2 (Two) Times a Day. Use as instructed to test blood sugar daily.  Dx: E11.9        Pharmacy where request should be sent: [employer]. [address] [phone] [facility] [phone] FX     Additional details provided by patient: LESS THAN THREE DAY SUPPLY     Does the patient have less than a 3 day supply:  [x] Yes  [] No    Delfin Galindo Rep   22 13:43 EDT

## 2022-08-31 ENCOUNTER — OFFICE VISIT (OUTPATIENT)
Dept: FAMILY MEDICINE CLINIC | Facility: CLINIC | Age: 67
End: 2022-08-31

## 2022-08-31 VITALS — BODY MASS INDEX: 38.06 KG/M2 | HEIGHT: 63 IN

## 2022-08-31 DIAGNOSIS — Z00.00 MEDICARE ANNUAL WELLNESS VISIT, SUBSEQUENT: Primary | ICD-10-CM

## 2022-08-31 PROCEDURE — 1160F RVW MEDS BY RX/DR IN RCRD: CPT | Performed by: NURSE PRACTITIONER

## 2022-08-31 PROCEDURE — 1170F FXNL STATUS ASSESSED: CPT | Performed by: NURSE PRACTITIONER

## 2022-08-31 PROCEDURE — G0439 PPPS, SUBSEQ VISIT: HCPCS | Performed by: NURSE PRACTITIONER

## 2022-08-31 PROCEDURE — 1126F AMNT PAIN NOTED NONE PRSNT: CPT | Performed by: NURSE PRACTITIONER

## 2022-08-31 NOTE — PROGRESS NOTES
The ABCs of the Annual Wellness Visit  Subsequent Medicare Wellness Visit    Chief Complaint   Patient presents with   • Medicare Wellness-subsequent      Subjective    History of Present Illness:  Anita Lemus is a 67 y.o. female who presents for a Subsequent Medicare Wellness Visit.  Patient agrees to have Medicare annual wellness completed via telehealth.  Patient is at her home and provider is at the office.    The following portions of the patient's history were reviewed and   updated as appropriate: allergies, current medications, past family history, past medical history, past social history, past surgical history and problem list.    Compared to one year ago, the patient feels her physical   health is better.    Compared to one year ago, the patient feels her mental   health is better.    Recent Hospitalizations:  She was not admitted to the hospital during the last year.       Current Medical Providers:  Patient Care Team:  Janet Camarillo APRN as PCP - General (Family Medicine)    Outpatient Medications Prior to Visit   Medication Sig Dispense Refill   • albuterol sulfate  (90 Base) MCG/ACT inhaler Inhale 2 puffs Every 4 (Four) Hours As Needed for Wheezing. 8 g 0   • aspirin 81 MG chewable tablet Chew 81 mg Daily.     • Blood Glucose Monitoring Suppl (Assure Pro Blood Glucose Meter) device 1 each Daily. Use as directed to test blood sugar daily. Dx: E11.9 100 each 3   • glucose blood test strip Use as instructed 100 each 5   • glucose blood test strip 100 each by Other route 2 (Two) Times a Day. Use as instructed to test blood sugar daily. 100 each 3   • insulin regular (humuLIN R,novoLIN R) 100 UNIT/ML injection Inject 5 Units under the skin into the appropriate area as directed 3 (Three) Times a Day Before Meals. 1 each 1   • Insulin Syringes, Disposable, U-100 1 ML misc 1 each 3 (Three) Times a Day. 100 each 3   • losartan (Cozaar) 25 MG tablet Take 1 tablet by mouth Daily. 90 tablet 1   •  "nystatin (MYCOSTATIN) 492691 UNIT/GM cream Apply  topically to the appropriate area as directed As Needed (rash). 15 g 1     No facility-administered medications prior to visit.       No opioid medication identified on active medication list. I have reviewed chart for other potential  high risk medication/s and harmful drug interactions in the elderly.          Aspirin is on active medication list. Aspirin use is indicated based on review of current medical condition/s. Pros and cons of this therapy have been discussed today. Benefits of this medication outweigh potential harm.  Patient has been encouraged to continue taking this medication.  .      Patient Active Problem List   Diagnosis   • Heart murmur   • Arthritis   • Benign hypertension   • Chronic pain   • Edema   • Morbid obesity (HCC)   • Thyroid nodule   • Type 2 diabetes mellitus (HCC)   • Vitamin D deficiency     Advance Care Planning  Advance Directive is not on file.  ACP discussion was held with the patient during this visit. Patient does not have an advance directive, information provided.          Objective    Vitals:    08/31/22 1251   Height: 160 cm (63\")   PainSc: 0-No pain     Estimated body mass index is 38.06 kg/m² as calculated from the following:    Height as of this encounter: 160 cm (63\").    Weight as of 8/23/22: 97.5 kg (214 lb 13.6 oz).    Class 2 Severe Obesity (BMI >=35 and <=39.9). Obesity-related health conditions include the following: hypertension, diabetes mellitus and osteoarthritis. Obesity is improving with treatment. BMI is is above average; BMI management plan is completed. We discussed low calorie, low carb based diet program, portion control and increasing exercise.      Does the patient have evidence of cognitive impairment? No    Physical Exam  Lab Results   Component Value Date    TRIG 97 08/23/2022    HDL 68 (H) 08/23/2022     (H) 08/23/2022    VLDL 17 08/23/2022    HGBA1C 6.30 (H) 08/23/2022            HEALTH " RISK ASSESSMENT    Smoking Status:  Social History     Tobacco Use   Smoking Status Never Smoker   Smokeless Tobacco Never Used     Alcohol Consumption:  Social History     Substance and Sexual Activity   Alcohol Use Never     Fall Risk Screen:    JOSE GADI Fall Risk Assessment was completed, and patient is at LOW risk for falls.Assessment completed on:8/31/2022    Depression Screening:  PHQ-2/PHQ-9 Depression Screening 8/31/2022   Retired PHQ-9 Total Score -   Retired Total Score -   Little Interest or Pleasure in Doing Things 0-->not at all   Feeling Down, Depressed or Hopeless 0-->not at all   PHQ-9: Brief Depression Severity Measure Score 0       Health Habits and Functional and Cognitive Screening:  Functional & Cognitive Status 8/31/2022   Do you have difficulty preparing food and eating? No   Do you have difficulty bathing yourself, getting dressed or grooming yourself? No   Do you have difficulty using the toilet? No   Do you have difficulty moving around from place to place? No   Do you have trouble with steps or getting out of a bed or a chair? No   Current Diet Well Balanced Diet   Dental Exam Up to date   Eye Exam Up to date   Exercise (times per week) 3 times per week   Current Exercises Include Walking;Other   Do you need help using the phone?  No   Are you deaf or do you have serious difficulty hearing?  No   Do you need help with transportation? No   Do you need help shopping? No   Do you need help preparing meals?  No   Do you need help with housework?  No   Do you need help with laundry? No   Do you need help taking your medications? No   Do you need help managing money? No   Do you ever drive or ride in a car without wearing a seat belt? No   Have you felt unusual stress, anger or loneliness in the last month? No   Who do you live with? Alone   If you need help, do you have trouble finding someone available to you? No   Have you been bothered in the last four weeks by sexual problems? No   Do you  have difficulty concentrating, remembering or making decisions? No       Age-appropriate Screening Schedule:  Refer to the list below for future screening recommendations based on patient's age, sex and/or medical conditions. Orders for these recommended tests are listed in the plan section. The patient has been provided with a written plan.    Health Maintenance   Topic Date Due   • TDAP/TD VACCINES (1 - Tdap) Never done   • ZOSTER VACCINE (1 of 2) Never done   • URINE MICROALBUMIN  02/25/2022   • DIABETIC EYE EXAM  03/04/2022   • DIABETIC FOOT EXAM  03/15/2022   • INFLUENZA VACCINE  10/01/2022   • HEMOGLOBIN A1C  02/23/2023   • DXA SCAN  05/06/2023   • LIPID PANEL  08/23/2023   • MAMMOGRAM  04/29/2024              Assessment & Plan   CMS Preventative Services Quick Reference  Risk Factors Identified During Encounter  Obesity/Overweight   Polypharmacy  The above risks/problems have been discussed with the patient.  Follow up actions/plans if indicated are seen below in the Assessment/Plan Section.  Pertinent information has been shared with the patient in the After Visit Summary.    Diagnoses and all orders for this visit:    1. Medicare annual wellness visit, subsequent (Primary)        Follow Up:   Return for Next scheduled follow up.     An After Visit Summary and PPPS were made available to the patient.

## 2022-09-20 ENCOUNTER — TELEPHONE (OUTPATIENT)
Dept: FAMILY MEDICINE CLINIC | Facility: CLINIC | Age: 67
End: 2022-09-20

## 2022-09-20 DIAGNOSIS — E11.43 TYPE 2 DIABETES MELLITUS WITH DIABETIC AUTONOMIC NEUROPATHY, WITH LONG-TERM CURRENT USE OF INSULIN: ICD-10-CM

## 2022-09-20 DIAGNOSIS — Z79.4 TYPE 2 DIABETES MELLITUS WITH DIABETIC AUTONOMIC NEUROPATHY, WITH LONG-TERM CURRENT USE OF INSULIN: ICD-10-CM

## 2022-09-20 NOTE — TELEPHONE ENCOUNTER
Caller: Anita Lemus    Relationship: Self    Best call back number: [phone]    Requested Prescriptions:   Requested Prescriptions     Pending Prescriptions Disp Refills   • glucose blood test strip 100 each 3     Si each by Other route 2 (Two) Times a Day. Use as instructed to test blood sugar daily.        Pharmacy where request should be sent: [facility]. [address] [phone] [facility] [phone] FX     Additional details provided by patient: ONE TOUCH ULTRA TEST STRIPS    Does the patient have less than a 3 day supply:  [x] Yes  [] No    Delfin Abbasi Rep   22 13:44 EDT

## 2022-10-26 DIAGNOSIS — Z79.4 TYPE 2 DIABETES MELLITUS WITH DIABETIC AUTONOMIC NEUROPATHY, WITH LONG-TERM CURRENT USE OF INSULIN: ICD-10-CM

## 2022-10-26 DIAGNOSIS — E11.43 TYPE 2 DIABETES MELLITUS WITH DIABETIC AUTONOMIC NEUROPATHY, WITH LONG-TERM CURRENT USE OF INSULIN: ICD-10-CM

## 2022-10-26 NOTE — TELEPHONE ENCOUNTER
Caller: Mariah Lucina    Relationship: Self    Best call back number: [phone]    Requested Prescriptions:   Requested Prescriptions     Pending Prescriptions Disp Refills   • insulin regular (humuLIN R,novoLIN R) 100 UNIT/ML injection 1 each 1     Sig: Inject 5 Units under the skin into the appropriate area as directed 3 (Three) Times a Day Before Meals.        Pharmacy where request should be sent: [facility]. [address] [phone] [place] [phone] FX       Does the patient have less than a 3 day supply:  [] Yes  [x] No    Delfin Hoyos Rep   10/26/22 10:26 EDT

## 2022-11-21 DIAGNOSIS — E11.43 TYPE 2 DIABETES MELLITUS WITH DIABETIC AUTONOMIC NEUROPATHY, WITH LONG-TERM CURRENT USE OF INSULIN: ICD-10-CM

## 2022-11-21 DIAGNOSIS — Z79.4 TYPE 2 DIABETES MELLITUS WITH DIABETIC AUTONOMIC NEUROPATHY, WITH LONG-TERM CURRENT USE OF INSULIN: ICD-10-CM

## 2022-11-21 NOTE — TELEPHONE ENCOUNTER
Caller: SYED HAYS    Relationship: Emergency Contact    Best call back number: [phone]    Requested Prescriptions:   Requested Prescriptions     Pending Prescriptions Disp Refills   • glucose blood test strip 100 each 5     Sig: Use as instructed        Pharmacy where request should be sent: [facility]. [address] [phone] [facility] [phone] FX     Additional details provided by patient: PATIENT STATED THAT THE 90 DAY SUPPLY IS RUNNING OUT TO SOON AND WOULD LIKE TO KNOW IF SHE CAN GET A HIGHER SUPPLY ON THIS MEDICATION     Does the patient have less than a 3 day supply:  [x] Yes  [] No

## 2023-03-29 DIAGNOSIS — E11.43 TYPE 2 DIABETES MELLITUS WITH DIABETIC AUTONOMIC NEUROPATHY, WITH LONG-TERM CURRENT USE OF INSULIN: ICD-10-CM

## 2023-03-29 DIAGNOSIS — Z79.4 TYPE 2 DIABETES MELLITUS WITH DIABETIC AUTONOMIC NEUROPATHY, WITH LONG-TERM CURRENT USE OF INSULIN: ICD-10-CM

## 2023-04-26 ENCOUNTER — TRANSCRIBE ORDERS (OUTPATIENT)
Dept: ADMINISTRATIVE | Facility: HOSPITAL | Age: 68
End: 2023-04-26
Payer: MEDICAID

## 2023-04-26 DIAGNOSIS — Z12.31 VISIT FOR SCREENING MAMMOGRAM: Primary | ICD-10-CM

## 2023-05-31 DIAGNOSIS — E11.43 TYPE 2 DIABETES MELLITUS WITH DIABETIC AUTONOMIC NEUROPATHY, WITH LONG-TERM CURRENT USE OF INSULIN: ICD-10-CM

## 2023-05-31 DIAGNOSIS — Z79.4 TYPE 2 DIABETES MELLITUS WITH DIABETIC AUTONOMIC NEUROPATHY, WITH LONG-TERM CURRENT USE OF INSULIN: ICD-10-CM

## 2023-05-31 RX ORDER — HUMAN INSULIN 100 [IU]/ML
INJECTION, SOLUTION SUBCUTANEOUS
Qty: 10 EACH | Refills: 1 | Status: SHIPPED | OUTPATIENT
Start: 2023-05-31

## 2023-08-23 ENCOUNTER — HOSPITAL ENCOUNTER (OUTPATIENT)
Dept: MAMMOGRAPHY | Facility: HOSPITAL | Age: 68
Discharge: HOME | End: 2023-08-23
Admitting: REGISTERED NURSE
Payer: MEDICARE

## 2023-08-23 DIAGNOSIS — Z12.31 VISIT FOR SCREENING MAMMOGRAM: ICD-10-CM

## 2023-08-23 PROCEDURE — 77063 BREAST TOMOSYNTHESIS BI: CPT

## 2023-08-23 PROCEDURE — 77067 SCR MAMMO BI INCL CAD: CPT

## 2024-01-09 ENCOUNTER — TRANSCRIBE ORDERS (OUTPATIENT)
Dept: ADMINISTRATIVE | Facility: HOSPITAL | Age: 69
End: 2024-01-09
Payer: MEDICARE

## 2024-01-09 DIAGNOSIS — R05.3 CHRONIC COUGH: Primary | ICD-10-CM

## 2024-01-18 ENCOUNTER — OFFICE VISIT (OUTPATIENT)
Dept: CARDIOLOGY | Facility: CLINIC | Age: 69
End: 2024-01-18
Payer: MEDICARE

## 2024-01-18 VITALS
BODY MASS INDEX: 38.62 KG/M2 | HEART RATE: 81 BPM | HEIGHT: 63 IN | WEIGHT: 218 LBS | DIASTOLIC BLOOD PRESSURE: 73 MMHG | SYSTOLIC BLOOD PRESSURE: 120 MMHG

## 2024-01-18 DIAGNOSIS — I48.19 ATRIAL FIBRILLATION, PERSISTENT: ICD-10-CM

## 2024-01-18 DIAGNOSIS — I10 ESSENTIAL HYPERTENSION: ICD-10-CM

## 2024-01-18 DIAGNOSIS — R06.09 DYSPNEA ON EXERTION: Primary | ICD-10-CM

## 2024-01-18 DIAGNOSIS — R60.0 LOWER EXTREMITY EDEMA: ICD-10-CM

## 2024-01-18 RX ORDER — AMOXICILLIN AND CLAVULANATE POTASSIUM 875; 125 MG/1; MG/1
1 TABLET, FILM COATED ORAL EVERY 12 HOURS SCHEDULED
COMMUNITY
Start: 2024-01-08 | End: 2024-01-24

## 2024-01-18 RX ORDER — FUROSEMIDE 20 MG/1
20 TABLET ORAL EVERY 24 HOURS
COMMUNITY
Start: 2024-01-10 | End: 2024-01-18 | Stop reason: SDUPTHER

## 2024-01-18 RX ORDER — FUROSEMIDE 40 MG/1
40 TABLET ORAL EVERY 24 HOURS
Qty: 90 TABLET | Refills: 3 | Status: SHIPPED | OUTPATIENT
Start: 2024-01-18

## 2024-01-18 NOTE — PROGRESS NOTES
Chief Complaint  morbid (severe) obesity due to excess calories and Follow-up (Elevated labs, stated pcp said she had an enlarged heart and heart failure/)    Subjective            Anita Lemus presents to [facility] C[facility]OLOG[facility]  History of Present Illness    Anita is here for follow-up.  She has not been seen since 2021.  She has been referred back from primary care due to elevated BNP.  In August she had a mechanical fall leading to left rib fractures and a hemothorax treated by left VATS.  She has recovered from this.  Most recent chest x-ray showed a small left pleural effusion unchanged from prior exam.  Her BNP was around 1800.  She reports the shortness of breath with exertion and lower extremity swelling as well as orthopnea.  She denies chest pain or palpitations.  She was started on Lasix but is not having a good diuretic effect with 20 mg.  She has been told that she has atrial fibrillation in the past but was never started on anticoagulation from what I can tell.    PMH  Past Medical History:   Diagnosis Date    Anemia     Arthritis     Atrial fibrillation     CHF (congestive heart failure)     Enlarged heart     Hypertension     Obesity     Type 1 diabetes     Type 2 diabetes mellitus     Vitamin D deficiency          SURGICALHX  Past Surgical History:   Procedure Laterality Date    BARIATRIC SURGERY      Sleeve    HYSTERECTOMY      total    KNEE ARTHROSCOPY Left     diagnostic    THYROID CYST EXCISION      aspiration    THYROIDECTOMY, PARTIAL          SOC  Social History     Socioeconomic History    Marital status:    Tobacco Use    Smoking status: Never    Smokeless tobacco: Never   Vaping Use    Vaping Use: Never used   Substance and Sexual Activity    Alcohol use: Never    Drug use: Never    Sexual activity: Defer         FAMHX  Family History   Problem Relation Age of Onset    Heart disease Mother     Diabetes Mother     Heart disease Father     Emphysema Father     Throat  "cancer Paternal Grandfather           ALLERGY  Allergies   Allergen Reactions    Cyclobenzaprine Irritability    Ketorolac Tromethamine Hives        MEDSCURRENT    Current Outpatient Medications:     albuterol sulfate  (90 Base) MCG/ACT inhaler, Inhale 2 puffs Every 4 (Four) Hours As Needed for Wheezing., Disp: 8 g, Rfl: 0    amoxicillin-clavulanate (AUGMENTIN) 875-125 MG per tablet, Take 1 tablet by mouth Every 12 (Twelve) Hours., Disp: , Rfl:     Blood Glucose Monitoring Suppl (Assure Pro Blood Glucose Meter) device, 1 each Daily. Use as directed to test blood sugar daily. Dx: E11.9, Disp: 100 each, Rfl: 3    furosemide (LASIX) 40 MG tablet, Take 1 tablet by mouth Daily., Disp: 90 tablet, Rfl: 3    glucose blood test strip, 100 each by Other route 2 (Two) Times a Day. Use as instructed to test blood sugar daily., Disp: 100 each, Rfl: 3    glucose blood test strip, Use as instructed to test blood sugar once daily, Disp: 100 each, Rfl: 1    Insulin Syringes, Disposable, U-100 1 ML misc, 1 each 3 (Three) Times a Day., Disp: 100 each, Rfl: 3    losartan (Cozaar) 25 MG tablet, Take 1 tablet by mouth Daily., Disp: 90 tablet, Rfl: 1    NovoLIN R 100 UNIT/ML injection, Inject 5 units into the skin 3 times daily before meals, Disp: 10 each, Rfl: 1    nystatin (MYCOSTATIN) 821206 UNIT/GM cream, Apply  topically to the appropriate area as directed As Needed (rash)., Disp: 15 g, Rfl: 1    apixaban (Eliquis) 5 MG tablet tablet, Take 1 tablet by mouth Every 12 (Twelve) Hours., Disp: 180 tablet, Rfl: 3      Review of Systems   Constitutional: Negative for malaise/fatigue.   Cardiovascular:  Positive for dyspnea on exertion, leg swelling and orthopnea. Negative for chest pain, near-syncope, palpitations and syncope.   Respiratory:  Positive for shortness of breath. Negative for cough.         Objective     /73   Pulse 81   Ht 160 cm (63\")   Wt 98.9 kg (218 lb)   BMI 38.62 kg/m²       General Appearance:   well " developed  well nourished  HENT:   oropharynx moist  lips not cyanotic  Neck:  thyroid not enlarged  supple  Respiratory:  no respiratory distress  normal breath sounds  no rales  Cardiovascular:  no jugular venous distention  Irregularly irregular  apical impulse normal  S1 normal, S2 normal  no S3, no S4   no murmur  no rub, no thrill  carotid pulses normal; no bruit  pedal pulses normal  lower extremity edema: 1+  Musculoskeletal:  no clubbing of fingers.   normocephalic, head atraumatic  Skin:   warm, dry  Psychiatric:  judgement and insight appropriate  normal mood and affect      Result Review :     The following data was reviewed by: PARADISE Breaux on 01/18/2024:      CBC          8/8/2023    04:45 8/9/2023    02:38 8/10/2023    05:30   CBC   WBC 10.21     7.94     6.18       RBC 3.34     3.20     3.22       Hemoglobin 9.8     9.4     9.4       Hematocrit 30.6     30.0     29.7       MCV 91.6     93.8     92.2       MCH 29.3     29.4     29.2       MCHC 32.0     31.3     31.6       RDW 15.6     15.8     15.8       Platelets 230     214     228          Details          This result is from an external source.                   Data reviewed : Cardiology studies primary care note reviewed.  Chest x-ray and labs reviewed.  CBC unremarkable.  BNP 1800.  Normal TSH.  Potassium 5.3.    Procedures      Anita Lemus  reports that she has never smoked. She has never used smokeless tobacco.. I have educated her on the risk of diseases from using tobacco products such as cancer, COPD, and heart disease.                Assessment and Plan        ASSESSMENT:  Encounter Diagnoses   Name Primary?    Dyspnea on exertion Yes    Atrial fibrillation, persistent     Lower extremity edema     Essential hypertension          PLAN:    1.  Anita has had ongoing dyspnea on exertion, lower extremity edema, and orthopnea.  BNP elevated.  Will obtain an echocardiogram to evaluate LV function.  She is not having a good  diuretic effect with 20 mg of Lasix, will increase to 40 mg.  BMP in 2 weeks.  2.  Atrial fibrillation of unknown duration-there are EKGs in the chart with atrial fibrillation dating back to 2021.  I do not see that the patient is on anticoagulation and has a [id number] of at least 3.  Start Eliquis 5 mg twice a day.  She did have a traumatic hemothorax previously, this has been over 5 months ago and has resolved.  Will do a 7-day Holter monitor to evaluate A-fib burden and rate control.  3.  Essential hypertension-controlled, continue current medical therapy.    Follow-up after testing.      Patient was given instructions and counseling regarding her condition or for health maintenance advice. Please see specific information pulled into the AVS if appropriate.           Parris Wallace, APRN   1/18/2024  14:47 EST

## 2024-01-26 ENCOUNTER — TELEPHONE (OUTPATIENT)
Dept: CARDIOLOGY | Facility: CLINIC | Age: 69
End: 2024-01-26
Payer: MEDICARE

## 2024-01-26 NOTE — TELEPHONE ENCOUNTER
If she had symptoms while wearing the monitor that will be all the information we need.  We can wait to see the results and see if we have enough data to go off of.  If it is not a good study I am happy to order another monitor for her.

## 2024-01-26 NOTE — TELEPHONE ENCOUNTER
Caller: Anita Lemus    Relationship: Self    Best call back number: [phone]     What orders are you requesting (i.e. lab or imaging): 24 HOUR HOLTER MONITOR     In what timeframe would the patient need to come in: WOULD LIKE TO COME IN ON 2.19.24 TO HAVE IT OFF BY 2.22.24 APPT    Where will you receive your lab/imaging services: [facility]    Additional notes: PT WOULD LIKE ANOTHER MONITOR DONE BECAUSE SHE HAD TO CONSTANTLY TAKE HER LAST ON OFF AND SHE FEELS LIKE SHE DIDN'T GET AN ACCURATE READING BECAUSE OF THAT

## 2024-02-05 ENCOUNTER — TELEPHONE (OUTPATIENT)
Dept: CARDIOLOGY | Facility: CLINIC | Age: 69
End: 2024-02-05
Payer: MEDICARE

## 2024-02-05 NOTE — TELEPHONE ENCOUNTER
----- Message from PARADISE Breaux sent at 2/5/2024  7:54 AM EST -----  Holter monitor reviewed.  Persistent atrial fibrillation throughout the study and at times rapid heart rate.  Symptoms correlated to periods of rapid heart rate.  I am sending in a new medication called metoprolol succinate to help with these fast heart rates.  She will take 25 mg twice a day.  Follow-up as scheduled.

## 2024-02-14 ENCOUNTER — HOSPITAL ENCOUNTER (OUTPATIENT)
Dept: CARDIOLOGY | Facility: HOSPITAL | Age: 69
Discharge: HOME | End: 2024-02-14
Admitting: NURSE PRACTITIONER
Payer: MEDICARE

## 2024-02-14 DIAGNOSIS — R06.09 DYSPNEA ON EXERTION: ICD-10-CM

## 2024-02-14 DIAGNOSIS — I48.19 ATRIAL FIBRILLATION, PERSISTENT: ICD-10-CM

## 2024-02-14 PROCEDURE — 93306 TTE W/DOPPLER COMPLETE: CPT

## 2024-02-15 LAB
AORTIC DIMENSIONLESS INDEX: 0.6 (DI)
BH CV ECHO MEAS - ACS: 1.7 CM
BH CV ECHO MEAS - AO MAX PG: 4 MMHG
BH CV ECHO MEAS - AO MEAN PG: 4 MMHG
BH CV ECHO MEAS - AO ROOT DIAM: 3.4 CM
BH CV ECHO MEAS - AO V2 MAX: 125 CM/SEC
BH CV ECHO MEAS - AO V2 VTI: 30 CM
BH CV ECHO MEAS - AVA(I,D): 2.29 CM2
BH CV ECHO MEAS - EDV(CUBED): 132.7 ML
BH CV ECHO MEAS - EDV(MOD-SP2): 69.4 ML
BH CV ECHO MEAS - EDV(MOD-SP4): 47.6 ML
BH CV ECHO MEAS - EF(MOD-BP): 43.7 %
BH CV ECHO MEAS - EF(MOD-SP2): 43.1 %
BH CV ECHO MEAS - EF(MOD-SP4): 47.9 %
BH CV ECHO MEAS - ESV(CUBED): 50.7 ML
BH CV ECHO MEAS - ESV(MOD-SP2): 39.5 ML
BH CV ECHO MEAS - ESV(MOD-SP4): 24.8 ML
BH CV ECHO MEAS - FS: 27.5 %
BH CV ECHO MEAS - IVS/LVPW: 1 CM
BH CV ECHO MEAS - IVSD: 1 CM
BH CV ECHO MEAS - LA DIMENSION: 4.4 CM
BH CV ECHO MEAS - LAT PEAK E' VEL: 7.7 CM/SEC
BH CV ECHO MEAS - LV DIASTOLIC VOL/BSA (35-75): 24 CM2
BH CV ECHO MEAS - LV MASS(C)D: 188 GRAMS
BH CV ECHO MEAS - LV MAX PG: 3.1 MMHG
BH CV ECHO MEAS - LV MEAN PG: 2 MMHG
BH CV ECHO MEAS - LV SYSTOLIC VOL/BSA (12-30): 12.5 CM2
BH CV ECHO MEAS - LV V1 MAX: 88.6 CM/SEC
BH CV ECHO MEAS - LV V1 VTI: 18.1 CM
BH CV ECHO MEAS - LVIDD: 5.1 CM
BH CV ECHO MEAS - LVIDS: 3.7 CM
BH CV ECHO MEAS - LVOT AREA: 3.8 CM2
BH CV ECHO MEAS - LVOT DIAM: 2.2 CM
BH CV ECHO MEAS - LVPWD: 1 CM
BH CV ECHO MEAS - MED PEAK E' VEL: 5.6 CM/SEC
BH CV ECHO MEAS - MV DEC SLOPE: 1256 CM/SEC2
BH CV ECHO MEAS - MV DEC TIME: 0.19 SEC
BH CV ECHO MEAS - MV E MAX VEL: 104 CM/SEC
BH CV ECHO MEAS - MV MEAN PG: 2 MMHG
BH CV ECHO MEAS - MV P1/2T: 34 MSEC
BH CV ECHO MEAS - MV V2 VTI: 24.6 CM
BH CV ECHO MEAS - MVA(P1/2T): 6.5 CM2
BH CV ECHO MEAS - MVA(VTI): 2.8 CM2
BH CV ECHO MEAS - PA V2 MAX: 75.4 CM/SEC
BH CV ECHO MEAS - QP/QS: 0.74
BH CV ECHO MEAS - RV MAX PG: 1.67 MMHG
BH CV ECHO MEAS - RV V1 MAX: 64.7 CM/SEC
BH CV ECHO MEAS - RV V1 VTI: 12.3 CM
BH CV ECHO MEAS - RVDD: 3.5 CM
BH CV ECHO MEAS - RVOT DIAM: 2.3 CM
BH CV ECHO MEAS - SI(MOD-SP2): 15.1 ML/M2
BH CV ECHO MEAS - SI(MOD-SP4): 11.5 ML/M2
BH CV ECHO MEAS - SV(LVOT): 68.8 ML
BH CV ECHO MEAS - SV(MOD-SP2): 29.9 ML
BH CV ECHO MEAS - SV(MOD-SP4): 22.8 ML
BH CV ECHO MEAS - SV(RVOT): 51.1 ML
BH CV ECHO MEAS - TAPSE (>1.6): 1 CM
BH CV ECHO MEASUREMENTS AVERAGE E/E' RATIO: 15.64
BH CV XLRA - TDI S': 7.8 CM/SEC
IVRT: 77 MS
LEFT ATRIUM VOLUME INDEX: 32.9 ML/M2

## 2024-02-16 ENCOUNTER — TELEPHONE (OUTPATIENT)
Dept: CARDIOLOGY | Facility: CLINIC | Age: 69
End: 2024-02-16
Payer: MEDICARE

## 2024-02-26 ENCOUNTER — TELEPHONE (OUTPATIENT)
Dept: CARDIOLOGY | Facility: CLINIC | Age: 69
End: 2024-02-26
Payer: MEDICARE

## 2024-02-26 NOTE — TELEPHONE ENCOUNTER
Caller: Anita Lemus    Relationship: Self    Best call back number: [phone]    What test/procedure requested: PATIENT NEEDED BLANKET AUTHORIZATION REGARDING 5 TESTS DONE.     When is it needed: AS SOON AS POSSIBLE    Where is the test/procedure going to be performed: TESTS WERE PERFORMED AT THE HOSPITAL    Additional information or concerns: PATIENT IS CONCERNED ABOUT HER BILLING STATEMENTS FROM [facility]. SHE WAS TOLD THAT THERE WEREN'T PRIOR AUTHORIZATIONS FOR PRIOR TESTING.

## 2024-02-29 ENCOUNTER — OFFICE VISIT (OUTPATIENT)
Dept: CARDIOLOGY | Facility: CLINIC | Age: 69
End: 2024-02-29
Payer: MEDICARE

## 2024-02-29 VITALS — SYSTOLIC BLOOD PRESSURE: 136 MMHG | DIASTOLIC BLOOD PRESSURE: 82 MMHG | HEART RATE: 75 BPM

## 2024-02-29 DIAGNOSIS — I51.89 MILD LEFT VENTRICULAR SYSTOLIC DYSFUNCTION (LVSD): ICD-10-CM

## 2024-02-29 DIAGNOSIS — I48.19 ATRIAL FIBRILLATION, PERSISTENT: Primary | ICD-10-CM

## 2024-02-29 DIAGNOSIS — I10 ESSENTIAL HYPERTENSION: ICD-10-CM

## 2024-02-29 RX ORDER — METOPROLOL SUCCINATE 50 MG/1
50 TABLET, EXTENDED RELEASE ORAL 2 TIMES DAILY
Qty: 180 TABLET | Refills: 3 | Status: SHIPPED | OUTPATIENT
Start: 2024-02-29

## 2024-02-29 NOTE — PROGRESS NOTES
Chief Complaint  FISCHER, Atrial Fibrillation, Hypertension, and Leg Swelling    Subjective            Anita Lemus presents to [facility] CARDIOLOGY  History of Present Illness    Anita is here for follow-up.  She had not been seen in the clinic for some time until recently.  She was referred back by primary care due to elevated BNP.  Last summer she sustained a mechanical fall leading to left rib fractures and hemothorax treated by left VATS.  She has recovered from this.  Her most recent chest x-ray showed a small left pleural effusion unchanged from prior exam.  She reported to me shortness of breath with exertion, lower extremity edema, and orthopnea.  I ordered an echocardiogram which showed mild LV dysfunction, ejection fraction 46 to 50%.  I also noticed in the chart that she had EKGs showing atrial fibrillation dating back to 2021 but it does not seem the patient was ever treated medically or started on anticoagulation.  Subsequent 7-day Holter monitor showed atrial fibrillation throughout with average heart rate 102.  There were episodes of rapid ventricular rates which her symptoms correlated to.  I then started her on anticoagulation and Toprol XL 25 mg twice daily.  Today she reports symptoms are improving.  She still does get short of breath with exertion but is feeling better with the metoprolol.  She denies palpitations.  Lower extremity edema is stable on Lasix.    PMH  Past Medical History:   Diagnosis Date    Anemia     Arthritis     Atrial fibrillation     CHF (congestive heart failure)     Enlarged heart     Hypertension     Obesity     Type 1 diabetes     Type 2 diabetes mellitus     Vitamin D deficiency          SURGICALHX  Past Surgical History:   Procedure Laterality Date    BARIATRIC SURGERY      Sleeve    HYSTERECTOMY      total    KNEE ARTHROSCOPY Left     diagnostic    THYROID CYST EXCISION      aspiration    THYROIDECTOMY, PARTIAL          SOC  Social History      Socioeconomic History    Marital status:    Tobacco Use    Smoking status: Never    Smokeless tobacco: Never   Vaping Use    Vaping Use: Never used   Substance and Sexual Activity    Alcohol use: Never    Drug use: Never    Sexual activity: Defer         FAMHX  Family History   Problem Relation Age of Onset    Heart disease Mother     Diabetes Mother     Heart disease Father     Emphysema Father     Throat cancer Paternal Grandfather           ALLERGY  Allergies   Allergen Reactions    Cyclobenzaprine Irritability    Ketorolac Tromethamine Hives        MEDSCURRENT    Current Outpatient Medications:     albuterol sulfate  (90 Base) MCG/ACT inhaler, Inhale 2 puffs Every 4 (Four) Hours As Needed for Wheezing., Disp: 8 g, Rfl: 0    apixaban (Eliquis) 5 MG tablet tablet, Take 1 tablet by mouth Every 12 (Twelve) Hours., Disp: 180 tablet, Rfl: 3    apixaban (ELIQUIS) 5 MG tablet tablet, Take 1 tablet by mouth 2 (Two) Times a Day., Disp: 28 tablet, Rfl: 0    Blood Glucose Monitoring Suppl (Assure Pro Blood Glucose Meter) device, 1 each Daily. Use as directed to test blood sugar daily. Dx: E11.9, Disp: 100 each, Rfl: 3    furosemide (LASIX) 40 MG tablet, Take 1 tablet by mouth Daily., Disp: 90 tablet, Rfl: 3    glucose blood test strip, 100 each by Other route 2 (Two) Times a Day. Use as instructed to test blood sugar daily., Disp: 100 each, Rfl: 3    glucose blood test strip, Use as instructed to test blood sugar once daily, Disp: 100 each, Rfl: 1    Insulin Syringes, Disposable, U-100 1 ML misc, 1 each 3 (Three) Times a Day., Disp: 100 each, Rfl: 3    losartan (Cozaar) 25 MG tablet, Take 1 tablet by mouth Daily., Disp: 90 tablet, Rfl: 1    metoprolol succinate XL (TOPROL-XL) 50 MG 24 hr tablet, Take 1 tablet by mouth 2 (Two) Times a Day., Disp: 180 tablet, Rfl: 3    NovoLIN R 100 UNIT/ML injection, Inject 5 units into the skin 3 times daily before meals, Disp: 10 each, Rfl: 1    nystatin (MYCOSTATIN)  [id number] UNIT/GM cream, Apply  topically to the appropriate area as directed As Needed (rash)., Disp: 15 g, Rfl: 1      Review of Systems   Constitutional: Negative for malaise/fatigue.   Cardiovascular:  Positive for dyspnea on exertion. Negative for chest pain, irregular heartbeat, leg swelling, near-syncope, palpitations and syncope.   Respiratory:  Positive for shortness of breath.         Objective     /82   Pulse 75       General Appearance:   well developed  well nourished  HENT:   oropharynx moist  lips not cyanotic  Neck:  thyroid not enlarged  supple  Respiratory:  no respiratory distress  normal breath sounds  no rales  Cardiovascular:  no jugular venous distention  Irregularly irregular  apical impulse normal  S1 normal, S2 normal  no S3, no S4   no murmur  no rub, no thrill  carotid pulses normal; no bruit  pedal pulses normal  lower extremity edema: Generalized lower extremities  Musculoskeletal:  no clubbing of fingers.   normocephalic, head atraumatic  Skin:   warm, dry  Psychiatric:  judgement and insight appropriate  normal mood and affect      Result Review :     The following data was reviewed by: PARADISE Breaux on 02/29/2024:      CBC          8/8/2023    04:45 8/9/2023    02:38 8/10/2023    05:30   CBC   WBC 10.21     7.94     6.18       RBC 3.34     3.20     3.22       Hemoglobin 9.8     9.4     9.4       Hematocrit 30.6     30.0     29.7       MCV 91.6     93.8     92.2       MCH 29.3     29.4     29.2       MCHC 32.0     31.3     31.6       RDW 15.6     15.8     15.8       Platelets 230     214     228          Details          This result is from an external source.                   Data reviewed : Cardiology studies cardiac testing reviewed above recent labs reviewed.  Normal TSH.  Potassium 5.3 creatinine 0.78.    Procedures      Anita Lemus  reports that she has never smoked. She has never used smokeless tobacco.. I have educated her on the risk of diseases from  using tobacco products such as cancer, COPD, and heart disease.                Assessment and Plan        ASSESSMENT:  Encounter Diagnoses   Name Primary?    Atrial fibrillation, persistent Yes    Essential hypertension     Mild left ventricular systolic dysfunction (LVSD)          PLAN:    1.  Persistent/permanent atrial fibrillation-EKGs showing atrial fibrillation dating back to 2021.  Holter monitor showed atrial fibrillation throughout with some rapid rates which she was symptomatic to. Since starting Toprol she is overall feeling better.  She has mild dyspnea on exertion.  She is very sedentary.  She denies chest pain or palpitations.  Since the patient is mostly asymptomatic, will pursue rate control strategy which she is agreeable to.  Continue anticoagulation for stroke prevention.  2.  Mild LV dysfunction-echocardiogram showed mild LV dysfunction ejection fraction 46 to 50%.  Her volume status is stable.  Edema improved on 40 mg of Lasix.  Renal function stable.  Increase Toprol to 50 mg twice a day.  Continue losartan.  3.  Essential hypertension-controlled, continue current medical therapy.    Will do a 6-week follow-up to evaluate symptoms and adjust medical therapy if necessary.  She has requested to see Dr. Sanches at this appointment, will arrange.      Patient was given instructions and counseling regarding her condition or for health maintenance advice. Please see specific information pulled into the AVS if appropriate.           Parris Wallace, APRN   2/29/2024  14:20 EST

## 2024-04-24 ENCOUNTER — OFFICE VISIT (OUTPATIENT)
Dept: CARDIOLOGY | Facility: CLINIC | Age: 69
End: 2024-04-24
Payer: MEDICARE

## 2024-04-24 VITALS
DIASTOLIC BLOOD PRESSURE: 83 MMHG | SYSTOLIC BLOOD PRESSURE: 125 MMHG | HEART RATE: 111 BPM | BODY MASS INDEX: 43.23 KG/M2 | HEIGHT: 63 IN | WEIGHT: 244 LBS

## 2024-04-24 DIAGNOSIS — I48.21 PERMANENT ATRIAL FIBRILLATION: Primary | ICD-10-CM

## 2024-04-24 DIAGNOSIS — I10 HYPERTENSION, ESSENTIAL: ICD-10-CM

## 2024-04-24 PROCEDURE — 1160F RVW MEDS BY RX/DR IN RCRD: CPT | Performed by: INTERNAL MEDICINE

## 2024-04-24 PROCEDURE — 3074F SYST BP LT 130 MM HG: CPT | Performed by: INTERNAL MEDICINE

## 2024-04-24 PROCEDURE — 1159F MED LIST DOCD IN RCRD: CPT | Performed by: INTERNAL MEDICINE

## 2024-04-24 PROCEDURE — 99214 OFFICE O/P EST MOD 30 MIN: CPT | Performed by: INTERNAL MEDICINE

## 2024-04-24 PROCEDURE — 3079F DIAST BP 80-89 MM HG: CPT | Performed by: INTERNAL MEDICINE

## 2024-04-24 NOTE — PROGRESS NOTES
Chief Complaint  Morbid (severe) obesity due to excess calories, Atrial Fibrillation, Dyspnea on exertion, Hypertension, and lvsd     Subjective            Anita Lemus presents to [facility] CARDIOLOGY      Anita is here for follow-up.  She has chronic atrial fibrillation since 2021, essential hypertension.  Overall she is doing relatively well.  She is still recovering from a fall and rib fractures resulting in pneumothorax which was treated with a VATS procedure previously.  She still has some discomfort when lying on her left side.  She denies palpitations or excessive shortness of breath.    PMH  Past Medical History:   Diagnosis Date    Anemia     Arthritis     Atrial fibrillation     CHF (congestive heart failure)     Enlarged heart     Hypertension     Obesity     Type 1 diabetes     Type 2 diabetes mellitus     Vitamin D deficiency          SURGICALHX  Past Surgical History:   Procedure Laterality Date    BARIATRIC SURGERY      Sleeve    HYSTERECTOMY      total    KNEE ARTHROSCOPY Left     diagnostic    THYROID CYST EXCISION      aspiration    THYROIDECTOMY, PARTIAL          SOC  Social History     Socioeconomic History    Marital status:    Tobacco Use    Smoking status: Never    Smokeless tobacco: Never   Vaping Use    Vaping status: Never Used   Substance and Sexual Activity    Alcohol use: Never    Drug use: Never    Sexual activity: Defer         FAMHX  Family History   Problem Relation Age of Onset    Heart disease Mother     Diabetes Mother     Heart disease Father     Emphysema Father     Throat cancer Paternal Grandfather           ALLERGY  Allergies   Allergen Reactions    Cyclobenzaprine Irritability    Ketorolac Tromethamine Hives        MEDSCURRENT    Current Outpatient Medications:     albuterol sulfate  (90 Base) MCG/ACT inhaler, Inhale 2 puffs Every 4 (Four) Hours As Needed for Wheezing., Disp: 8 g, Rfl: 0    apixaban (Eliquis) 5 MG tablet tablet, Take 1  "tablet by mouth Every 12 (Twelve) Hours., Disp: 180 tablet, Rfl: 3    apixaban (ELIQUIS) 5 MG tablet tablet, Take 1 tablet by mouth 2 (Two) Times a Day., Disp: 28 tablet, Rfl: 0    Blood Glucose Monitoring Suppl (Assure Pro Blood Glucose Meter) device, 1 each Daily. Use as directed to test blood sugar daily. Dx: E11.9, Disp: 100 each, Rfl: 3    furosemide (LASIX) 40 MG tablet, Take 1 tablet by mouth Daily., Disp: 90 tablet, Rfl: 3    glucose blood test strip, 100 each by Other route 2 (Two) Times a Day. Use as instructed to test blood sugar daily., Disp: 100 each, Rfl: 3    glucose blood test strip, Use as instructed to test blood sugar once daily, Disp: 100 each, Rfl: 1    Insulin Syringes, Disposable, U-100 1 ML misc, 1 each 3 (Three) Times a Day., Disp: 100 each, Rfl: 3    losartan (Cozaar) 25 MG tablet, Take 1 tablet by mouth Daily., Disp: 90 tablet, Rfl: 1    metoprolol succinate XL (TOPROL-XL) 50 MG 24 hr tablet, Take 1 tablet by mouth 2 (Two) Times a Day., Disp: 180 tablet, Rfl: 3    NovoLIN R 100 UNIT/ML injection, Inject 5 units into the skin 3 times daily before meals, Disp: 10 each, Rfl: 1    nystatin (MYCOSTATIN) 191563 UNIT/GM cream, Apply  topically to the appropriate area as directed As Needed (rash)., Disp: 15 g, Rfl: 1      Review of Systems   Constitutional: Negative for malaise/fatigue.   Cardiovascular:  Negative for chest pain, irregular heartbeat, leg swelling, near-syncope, palpitations and syncope.   Respiratory:  Positive for shortness of breath.    Hematologic/Lymphatic: Does not bruise/bleed easily.        Objective     /83   Pulse 111   Ht 160 cm (63\")   Wt 111 kg (244 lb)   BMI 43.22 kg/m²       General Appearance:   well developed  well nourished  HENT:   oropharynx moist  lips not cyanotic  Neck:  thyroid not enlarged  supple  Respiratory:  no respiratory distress  normal breath sounds  no rales  Cardiovascular:  no jugular venous distention  Irregularly irregular  apical " impulse normal  S1 normal, S2 normal  no S3, no S4   no murmur  no rub, no thrill  carotid pulses normal; no bruit  pedal pulses normal  lower extremity edema: Mild  Musculoskeletal:  no clubbing of fingers.   normocephalic, head atraumatic  Skin:   warm, dry  Psychiatric:  judgement and insight appropriate  normal mood and affect      Result Review :     The following data was reviewed by: Mono Sanches MD on 02/29/2024:      CBC          8/8/2023    04:45 8/9/2023    02:38 8/10/2023    05:30   CBC   WBC 10.21     7.94     6.18       RBC 3.34     3.20     3.22       Hemoglobin 9.8     9.4     9.4       Hematocrit 30.6     30.0     29.7       MCV 91.6     93.8     92.2       MCH 29.3     29.4     29.2       MCHC 32.0     31.3     31.6       RDW 15.6     15.8     15.8       Platelets 230     214     228          Details          This result is from an external source.                   Data reviewed : Primary care records reviewed recent labs reviewed.      Procedures               Assessment and Plan        ASSESSMENT:  Encounter Diagnoses   Name Primary?    Permanent atrial fibrillation Yes    Hypertension, essential          PLAN:    1.  Permanent atrial fibrillation-rate control seems adequate, continue beta-blocker and anticoagulation  2.  Mild LV dysfunction-echocardiogram showed mild LV dysfunction ejection fraction 46 to 50%.  Her volume status is stable.  Continue Lasix, ARB, beta-blocker  3.  Essential hypertension-controlled, continue current medical therapy.    Routine follow-up will be arranged      Patient was given instructions and counseling regarding her condition or for health maintenance advice. Please see specific information pulled into the AVS if appropriate.           Mono Sanches MD   4/24/2024  11:59 EDT

## 2024-06-11 ENCOUNTER — TELEPHONE (OUTPATIENT)
Dept: CARDIOLOGY | Facility: CLINIC | Age: 69
End: 2024-06-11
Payer: MEDICARE

## 2024-06-11 NOTE — TELEPHONE ENCOUNTER
Procedure: Colonoscopy and/or EGD     Med Directive: Eliquis     PMH: afib, HTN     Last Seen: 4/24/24

## 2024-06-11 NOTE — TELEPHONE ENCOUNTER
Moderate stable cardiovascular risk.  May hold Eliquis for 3 days prior.  No additional testing necessary.

## 2024-11-21 ENCOUNTER — OFFICE VISIT (OUTPATIENT)
Dept: CARDIOLOGY | Facility: CLINIC | Age: 69
End: 2024-11-21
Payer: MEDICARE

## 2024-11-21 VITALS
WEIGHT: 261 LBS | DIASTOLIC BLOOD PRESSURE: 86 MMHG | HEIGHT: 63 IN | SYSTOLIC BLOOD PRESSURE: 135 MMHG | HEART RATE: 92 BPM | BODY MASS INDEX: 46.25 KG/M2

## 2024-11-21 DIAGNOSIS — I48.21 PERMANENT ATRIAL FIBRILLATION: Primary | ICD-10-CM

## 2024-11-21 DIAGNOSIS — I10 HYPERTENSION, ESSENTIAL: ICD-10-CM

## 2024-11-21 DIAGNOSIS — I51.89 MILD LEFT VENTRICULAR SYSTOLIC DYSFUNCTION (LVSD): ICD-10-CM

## 2024-11-21 DIAGNOSIS — I10 ESSENTIAL HYPERTENSION: ICD-10-CM

## 2024-11-21 RX ORDER — ATORVASTATIN CALCIUM 10 MG/1
1 TABLET, FILM COATED ORAL DAILY
COMMUNITY
Start: 2024-11-08 | End: 2024-11-21 | Stop reason: SDUPTHER

## 2024-11-21 RX ORDER — ATORVASTATIN CALCIUM 10 MG/1
10 TABLET, FILM COATED ORAL DAILY
Qty: 90 TABLET | Refills: 3 | Status: SHIPPED | OUTPATIENT
Start: 2024-11-21

## 2024-11-21 RX ORDER — LOSARTAN POTASSIUM 25 MG/1
25 TABLET ORAL DAILY
Qty: 90 TABLET | Refills: 3 | Status: SHIPPED | OUTPATIENT
Start: 2024-11-21

## 2024-11-21 NOTE — PROGRESS NOTES
Chief Complaint  7 month follow  up , Atrial Fibrillation, Hypertension, and muscle weakness on legs     Subjective            Anita Lemus presents to [facility] CARDIOLOGY  History of Present Illness    History of Present Illness  Anita is here for follow-up evaluation and management of chronic atrial fibrillation, essential hypertension.    She reports that her legs tire quickly when standing, and this problem has been progressively worsening.  She has also noticed weight gain, which she believes may be contributing to his leg weakness. She mentions recent leg cramps but does not experience any leg pain while at rest or sitting.    She reports no new cardiac symptoms and has not experienced any chest pain or palpitations recently. She is currently on Eliquis and reports no bleeding complications.       [facility]  Past Medical History:   Diagnosis Date    Anemia     Arthritis     Atrial fibrillation     CHF (congestive heart failure)     Enlarged heart     Hypertension     Obesity     Type 1 diabetes     Type 2 diabetes mellitus     Vitamin D deficiency          SURGICALHX  Past Surgical History:   Procedure Laterality Date    BARIATRIC SURGERY      Sleeve    HYSTERECTOMY      total    KNEE ARTHROSCOPY Left     diagnostic    THYROID CYST EXCISION      aspiration    THYROIDECTOMY, PARTIAL          SOC  Social History     Socioeconomic History    Marital status:    Tobacco Use    Smoking status: Never    Smokeless tobacco: Never   Vaping Use    Vaping status: Never Used   Substance and Sexual Activity    Alcohol use: Never    Drug use: Never    Sexual activity: Defer         FAMHX  Family History   Problem Relation Age of Onset    Heart disease Mother     Diabetes Mother     Heart disease Father     Emphysema Father     Throat cancer Paternal Grandfather           ALLERGY  Allergies   Allergen Reactions    Cyclobenzaprine Irritability    Ketorolac Tromethamine Hives        MEDSCURRENT    Current  "Outpatient Medications:     albuterol sulfate  (90 Base) MCG/ACT inhaler, Inhale 2 puffs Every 4 (Four) Hours As Needed for Wheezing., Disp: 8 g, Rfl: 0    apixaban (Eliquis) 5 MG tablet tablet, Take 1 tablet by mouth Every 12 (Twelve) Hours., Disp: 180 tablet, Rfl: 3    apixaban (ELIQUIS) 5 MG tablet tablet, Take 1 tablet by mouth 2 (Two) Times a Day., Disp: 28 tablet, Rfl: 0    atorvastatin (LIPITOR) 10 MG tablet, Take 1 tablet by mouth Daily., Disp: 90 tablet, Rfl: 3    Blood Glucose Monitoring Suppl (Assure Pro Blood Glucose Meter) device, 1 each Daily. Use as directed to test blood sugar daily. Dx: E11.9, Disp: 100 each, Rfl: 3    furosemide (LASIX) 40 MG tablet, Take 1 tablet by mouth Daily., Disp: 90 tablet, Rfl: 3    glucose blood test strip, 100 each by Other route 2 (Two) Times a Day. Use as instructed to test blood sugar daily., Disp: 100 each, Rfl: 3    glucose blood test strip, Use as instructed to test blood sugar once daily, Disp: 100 each, Rfl: 1    Insulin Syringes, Disposable, U-100 1 ML misc, 1 each 3 (Three) Times a Day., Disp: 100 each, Rfl: 3    losartan (Cozaar) 25 MG tablet, Take 1 tablet by mouth Daily., Disp: 90 tablet, Rfl: 3    metoprolol succinate XL (TOPROL-XL) 50 MG 24 hr tablet, Take 1 tablet by mouth 2 (Two) Times a Day., Disp: 180 tablet, Rfl: 3    NovoLIN R 100 UNIT/ML injection, Inject 5 units into the skin 3 times daily before meals, Disp: 10 each, Rfl: 1    nystatin (MYCOSTATIN) [id number] UNIT/GM cream, Apply  topically to the appropriate area as directed As Needed (rash)., Disp: 15 g, Rfl: 1      Review of Systems   Cardiovascular:  Negative for chest pain, dyspnea on exertion, palpitations and syncope.   Hematologic/Lymphatic: Negative for bleeding problem.   Musculoskeletal:  Positive for muscle cramps and muscle weakness.        Objective     /86   Pulse 92   Ht 160 cm (63\")   Wt 118 kg (261 lb)   BMI 46.23 kg/m²       General Appearance:   well developed  well " nourished  HENT:   oropharynx moist  lips not cyanotic  Neck:  thyroid not enlarged  supple  Respiratory:  no respiratory distress  normal breath sounds  no rales  Cardiovascular:  no jugular venous distention  Irregularly irregular  apical impulse normal  S1 normal, S2 normal  no S3, no S4   no murmur  no rub, no thrill  carotid pulses normal; no bruit  pedal pulses normal  lower extremity edema: none    Musculoskeletal:  no clubbing of fingers.   normocephalic, head atraumatic  Skin:   warm, dry  Psychiatric:  judgement and insight appropriate  normal mood and affect    Physical Exam           Result Review :     Results         The following data was reviewed by: PARADISE Breaux on 11/21/2024:                   Procedures      Anita Lemus  reports that she has never smoked. She has never used smokeless tobacco. I have educated her on the risk of diseases from using tobacco products such as cancer, COPD, and heart disease.            Assessment and Plan        ASSESSMENT:  Encounter Diagnoses   Name Primary?    Essential hypertension     Permanent atrial fibrillation Yes    Hypertension, essential     Mild left ventricular systolic dysfunction (LVSD)        Assessment & Plan  1. Leg weakness  The leg weakness is unlikely related to atorvastatin, however she does report some muscle aches and cramps.  She is advised to temporarily discontinue atorvastatin for a few weeks to see if there is any improvement in her leg weakness. If there is no improvement, she will resume taking it. If there is a significant improvement, she will let me know and alternative medication options will be considered.     2.  Essential hypertension  Blood pressure is controlled.  Continue current medical therapy.    3.  Permanent atrial fibrillation, rate controlled, asymptomatic.  Continue beta-blocker and anticoagulation.  No bleeding problems.    4.  Mild LV dysfunction, 46 to 50% ejection fraction.  Volume status stable.   Continue Lasix, ARB, and beta-blocker.      Follow-up  Patient is scheduled for a follow-up visit with Dr. Sanches in 1 year or sooner if necessary.       Patient was given instructions and counseling regarding her condition or for health maintenance advice. Please see specific information pulled into the AVS if appropriate.       PARADISE Breaux   11/21/2024  13:16 EST    Patient or patient representative verbalized consent for the use of Ambient Listening during the visit with  PARADISE Breaux for chart documentation. 11/21/2024  13:21 EST

## 2025-01-28 DIAGNOSIS — I10 ESSENTIAL HYPERTENSION: ICD-10-CM

## 2025-01-28 RX ORDER — APIXABAN 5 MG/1
5 TABLET, FILM COATED ORAL EVERY 12 HOURS SCHEDULED
Qty: 180 TABLET | Refills: 0 | Status: SHIPPED | OUTPATIENT
Start: 2025-01-28

## 2025-01-28 RX ORDER — METOPROLOL SUCCINATE 50 MG/1
50 TABLET, EXTENDED RELEASE ORAL 2 TIMES DAILY
Qty: 180 TABLET | Refills: 0 | Status: SHIPPED | OUTPATIENT
Start: 2025-01-28

## 2025-01-28 RX ORDER — LOSARTAN POTASSIUM 25 MG/1
25 TABLET ORAL DAILY
Qty: 90 TABLET | Refills: 3 | Status: CANCELLED | OUTPATIENT
Start: 2025-01-28

## 2025-01-28 RX ORDER — ATORVASTATIN CALCIUM 10 MG/1
10 TABLET, FILM COATED ORAL DAILY
Qty: 90 TABLET | Refills: 3 | Status: CANCELLED | OUTPATIENT
Start: 2025-01-28

## 2025-01-28 NOTE — TELEPHONE ENCOUNTER
Rx Refill Note  Requested Prescriptions     Pending Prescriptions Disp Refills    atorvastatin (LIPITOR) 10 MG tablet 90 tablet 3     Sig: Take 1 tablet by mouth Daily.    losartan (Cozaar) 25 MG tablet 90 tablet 3     Sig: Take 1 tablet by mouth Daily.        LAST OFFICE VISIT:  11/21/2024     NEXT OFFICE VISIT:  11/21/2025     Does the medication requests match the last office note:    [x] Yes   [] No    Does this refill request meet protocol details for MA to approve:     [] Yes   [x] No    PT NEEDS UPDATED LABS

## 2025-01-30 RX ORDER — LOSARTAN POTASSIUM 25 MG/1
25 TABLET ORAL DAILY
Qty: 90 TABLET | Refills: 3 | Status: SHIPPED | OUTPATIENT
Start: 2025-01-30

## 2025-01-30 RX ORDER — ATORVASTATIN CALCIUM 10 MG/1
10 TABLET, FILM COATED ORAL DAILY
Qty: 90 TABLET | Refills: 3 | Status: SHIPPED | OUTPATIENT
Start: 2025-01-30

## 2025-05-08 RX ORDER — METOPROLOL SUCCINATE 50 MG/1
50 TABLET, EXTENDED RELEASE ORAL 2 TIMES DAILY
Qty: 180 TABLET | Refills: 0 | Status: SHIPPED | OUTPATIENT
Start: 2025-05-08

## 2025-08-19 RX ORDER — METOPROLOL SUCCINATE 50 MG/1
50 TABLET, EXTENDED RELEASE ORAL 2 TIMES DAILY
Qty: 180 TABLET | Refills: 0 | Status: SHIPPED | OUTPATIENT
Start: 2025-08-19